# Patient Record
Sex: FEMALE | Race: WHITE | NOT HISPANIC OR LATINO | Employment: PART TIME | URBAN - METROPOLITAN AREA
[De-identification: names, ages, dates, MRNs, and addresses within clinical notes are randomized per-mention and may not be internally consistent; named-entity substitution may affect disease eponyms.]

---

## 2017-02-15 ENCOUNTER — HOSPITAL ENCOUNTER (OUTPATIENT)
Dept: SLEEP MEDICINE | Facility: HOSPITAL | Age: 32
Discharge: HOME | End: 2017-02-15
Admitting: INTERNAL MEDICINE

## 2017-02-15 PROCEDURE — G0463 HOSPITAL OUTPT CLINIC VISIT: HCPCS

## 2017-02-15 PROCEDURE — 99214 OFFICE O/P EST MOD 30 MIN: CPT | Performed by: INTERNAL MEDICINE

## 2017-02-18 PROBLEM — IMO0002 SLEEP-RELATED HYPOVENTILATION: Status: ACTIVE | Noted: 2017-02-18

## 2017-02-18 NOTE — PROGRESS NOTES
[facility] Up [facility] Note 2/15/2017      Patient Care Team:  David Sheldon MD as PCP - General  David Sheldon MD as Emergency Attending (Internal Medicine)    Chief Complaint:  IKRA     Interval History:   The patient was last seen by me in December 2016.  She uses auto titrating BiPAP.  She had a significant oxygen deficit.  I ordered overnight oximetry on BiPAP on room air.  The report from January 18, 2017 does not list if it on room air alone or CPAP on room air?  She still has significant oxygen desaturation to 48% and 1 hour and 25 minutes revealed sleep-related hypoxemia.    The patient reports she is unchanged.  I have no downloads available today.  She goes to bed between 10 and 10:30 PM and awakens between 4:30 and 5 AM.  She wakes up 3-4 times during the nighttime.  Her [place] Sleepiness Scale is abnormal at 16.    Past medical history is unchanged.  However, she is having an MRI done on her lower back.    Family history: Unchanged.    Review of Systems:  Recorded on the Sleep Questionnaire.  Unremarkable except for shortness of breath/ADAMS and atypical chest pain.    Social History:  She continues to smoke cigarettes.  Alcohol is social.  She has 8 caffeinated beverages a day.    Allergies:  No known medical allergies.     Medication Review:  Reviewed.      Vital Signs:  Height 60 inches and weighs 358 and she is obese with a body mass index greater than 54.    Physical Exam:    Constitutional:  Well developed white female and appears in no apparent distress.  Awake & oriented times 3.  Normal mood with normal recent and remote memory and normal judgement.  Eyes:  Conjunctivae normal.  Oropharynx:  moist mucous membranes without exudate and a large tongue and class III Mallampati airway and posterior pharyngeal region not well seen.      Results Review:  DME is [facility] and she uses a fullface mask.  No downloads available.  Overnight oximetry as reported above.       Impression:   Known severe  obstructive sleep apnea with sleep-related hypoxemia with persistent complaints of hypersomnolence.  I have no downloads available to determine if she is compliant and are not.  I have an overnight oximetry that is not recorded on how was done.  The patient does not remember.      Plan:  Good sleep hygiene measures should be maintained weight loss would be beneficial.  Downloads will be obtained at the same time that the patient undergoes a repeat overnight oximetry on BiPAP on room air.  The report should be identified under what circumstances it was done.  If the patient has persistent complaints of sleep-related hypoxemia, an overnight polysomnogram with BiPAP titration and oxygen titration will need to be performed.  Further recommendations will be made after the results are known.      Beau Niño MD  02/18/17  10:32 AM      2/22/2017:  Addendum:  [facility] has provided an updated overnight oximetry report from 1/18/2017.  They stated it is on BiPAP and room air.  The patient demonstrates significant desaturation of 48%.  Total valid sampling time is 6 hours and 24 minutes.  Time with O2 saturations less than 88 is 1 hour and 25 minutes.  Therefore, I would recommend a repeat overnight polysomnogram with positive airway pressure titration as well as oxygen titration.  This will be arranged.  Beau Niño M.D.

## 2017-02-22 ENCOUNTER — TELEPHONE (OUTPATIENT)
Dept: SLEEP MEDICINE | Facility: HOSPITAL | Age: 32
End: 2017-02-22

## 2017-02-22 DIAGNOSIS — IMO0002 SLEEP-RELATED HYPOVENTILATION: ICD-10-CM

## 2017-02-22 DIAGNOSIS — G47.33 OSA TREATED WITH BIPAP: Primary | ICD-10-CM

## 2017-02-22 NOTE — TELEPHONE ENCOUNTER
Spoke with pt about her overnight oximetry while on cpap.  Pt s/u with titration study on 2/28/17 @ 830pm

## 2017-02-28 ENCOUNTER — HOSPITAL ENCOUNTER (OUTPATIENT)
Dept: SLEEP MEDICINE | Facility: HOSPITAL | Age: 32
Discharge: HOME | End: 2017-02-28
Admitting: INTERNAL MEDICINE

## 2017-02-28 DIAGNOSIS — IMO0002 SLEEP-RELATED HYPOVENTILATION: ICD-10-CM

## 2017-02-28 DIAGNOSIS — G47.33 OSA TREATED WITH BIPAP: ICD-10-CM

## 2017-02-28 PROCEDURE — 95811 POLYSOM 6/>YRS CPAP 4/> PARM: CPT | Performed by: INTERNAL MEDICINE

## 2017-02-28 PROCEDURE — 95811 POLYSOM 6/>YRS CPAP 4/> PARM: CPT

## 2017-03-07 ENCOUNTER — TELEPHONE (OUTPATIENT)
Dept: SLEEP MEDICINE | Facility: HOSPITAL | Age: 32
End: 2017-03-07

## 2017-03-07 NOTE — TELEPHONE ENCOUNTER
Spoke with pt about her pap titration.  Info for device faxed to [facility].  Pt already has r/v s/u wit dr. longo for 5/25/17

## 2017-03-14 ENCOUNTER — TELEPHONE (OUTPATIENT)
Dept: SLEEP MEDICINE | Facility: HOSPITAL | Age: 32
End: 2017-03-14

## 2017-03-14 NOTE — TELEPHONE ENCOUNTER
Returned pt's call to [facility].  Unable to leave message @ this # due to mailbox not being set up.

## 2017-03-15 ENCOUNTER — TELEPHONE (OUTPATIENT)
Dept: SLEEP MEDICINE | Facility: HOSPITAL | Age: 32
End: 2017-03-15

## 2017-05-07 ENCOUNTER — HOSPITAL ENCOUNTER (EMERGENCY)
Dept: HOSPITAL 23 - CED | Age: 32
Discharge: HOME | End: 2017-05-07
Payer: COMMERCIAL
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
## 2017-05-07 DIAGNOSIS — S06.0X0A: Primary | ICD-10-CM

## 2017-05-07 DIAGNOSIS — F17.210: ICD-10-CM

## 2017-05-25 ENCOUNTER — APPOINTMENT (OUTPATIENT)
Dept: SLEEP MEDICINE | Facility: HOSPITAL | Age: 32
End: 2017-05-25

## 2017-06-21 ENCOUNTER — HOSPITAL ENCOUNTER (OUTPATIENT)
Dept: SLEEP MEDICINE | Facility: HOSPITAL | Age: 32
Discharge: HOME | End: 2017-06-21
Admitting: INTERNAL MEDICINE

## 2017-06-21 PROCEDURE — 99214 OFFICE O/P EST MOD 30 MIN: CPT | Performed by: INTERNAL MEDICINE

## 2017-06-21 PROCEDURE — G0463 HOSPITAL OUTPT CLINIC VISIT: HCPCS

## 2017-06-26 NOTE — PROGRESS NOTES
Sleep Disorders Center Follow up Note        Patient Care Team:  David Sheldon MD as Emergency Attending (Internal Medicine)  Beau Niño MD as Consulting Physician (Sleep Medicine)    Chief complaint:  KIRA    History of present illness:  The patient is a 32 y.o. female who has known severe obstructive sleep apnea with sleep-related hypoxemia.  She was asleep in the waiting room.  She has marked ADAMS walking back to the office.  The patient states she thinks her machine needs to be replaced because she is having problems with it.  She states she is only sleeping 3-4 hours.  She awakens 4-5 times to go to the bathroom.  [place] Sleepiness Scale is abnormal at 21.    She has been more depressed.  She has not seen her father for 7 years.  Father's day was a hard time for her.  She falls asleep in the living room.    Review of Systems:  Recorded on the Sleep Questionnaire.  Unremarkable except for CHRISTIN, and anxiety and depression.    Past Medical History:  Unchanged since my last visit.    Family history: Unchanged.    Social History:  She continues to smoke cigarettes.  No alcohol.  She has 2 or 3 cups of soda or tea.    Allergies:  No known medical allergies.     Medication Review: Reviewed.    Vital Signs:  Height 60 inches and weight 350 and she is morbidly obese with a body mass index greater than 54.     Physical Exam:  Recorded on the [facility] Physical Exam Form and is unremarkable except for:  Large tongue with class III MP airway and posterior pharyngeal region not well seen.     Results Review:  DME is [facility] and she uses a fullface mask.  Downloads between December 13, 2016-Greer 10, 2017 compliance is 47% and average usage is 4 hours and 7 minutes and average AHI is normal without significant leak.  Average auto BiPAP pressures: IPAP 20 and EPAP 15.8.  Auto BiPAP settings: Max IPAP 25 and minimum EPAP 19 and max pressure support 6 and minimal pressure support 2.       Impression:    Obstructive sleep apnea with sleep-related hypoxemia adequately treated with auto titrating BiPAP.  However, the patient demonstrates poor compliance and usage.  She describes that her machine is not working and she feels that it needs to be replaced?  She also uses O2 2 L with auto BiPAP.  She has persistent complaints of hypersomnolence.    Plan:  Good sleep hygiene measures should be maintained and weight loss would be beneficial.  I reviewed all with the patient.  An order was sent to her DME to check out her BiPAP.  Contact numbers provided.  Additionally, I completed the patient's CMN for her oxygen.    I will see the patient back in 2 months.    Thank you for allowing me to assist in this patient's care.    Beau Niño MD  06/26/17  7:06 AM

## 2017-07-28 ENCOUNTER — OFFICE VISIT (OUTPATIENT)
Dept: FAMILY MEDICINE CLINIC | Facility: CLINIC | Age: 32
End: 2017-07-28

## 2017-07-28 VITALS
HEART RATE: 91 BPM | WEIGHT: 293 LBS | HEIGHT: 60 IN | DIASTOLIC BLOOD PRESSURE: 80 MMHG | OXYGEN SATURATION: 96 % | BODY MASS INDEX: 57.52 KG/M2 | TEMPERATURE: 98.4 F | SYSTOLIC BLOOD PRESSURE: 126 MMHG

## 2017-07-28 DIAGNOSIS — M54.50 PAIN OF LUMBOSACRAL SPINE: ICD-10-CM

## 2017-07-28 DIAGNOSIS — R31.9 HEMATURIA: ICD-10-CM

## 2017-07-28 DIAGNOSIS — M25.562 CHRONIC PAIN OF LEFT KNEE: ICD-10-CM

## 2017-07-28 DIAGNOSIS — F30.9 BIPOLAR I DISORDER, SINGLE MANIC EPISODE (HCC): ICD-10-CM

## 2017-07-28 DIAGNOSIS — R63.1 POLYDIPSIA: ICD-10-CM

## 2017-07-28 DIAGNOSIS — E66.01 MORBID OBESITY DUE TO EXCESS CALORIES (HCC): Primary | ICD-10-CM

## 2017-07-28 DIAGNOSIS — F17.218 CIGARETTE NICOTINE DEPENDENCE WITH OTHER NICOTINE-INDUCED DISORDER: ICD-10-CM

## 2017-07-28 DIAGNOSIS — G89.29 CHRONIC PAIN OF LEFT KNEE: ICD-10-CM

## 2017-07-28 DIAGNOSIS — E28.2 PCOS (POLYCYSTIC OVARIAN SYNDROME): ICD-10-CM

## 2017-07-28 DIAGNOSIS — R73.03 PREDIABETES: ICD-10-CM

## 2017-07-28 PROBLEM — B96.89 AV (ANAEROBIC VAGINOSIS): Status: ACTIVE | Noted: 2017-07-28

## 2017-07-28 PROBLEM — N91.1 AMENORRHEA, SECONDARY: Status: ACTIVE | Noted: 2017-07-28

## 2017-07-28 PROBLEM — J45.30 MILD PERSISTENT ASTHMA WITHOUT COMPLICATION: Status: ACTIVE | Noted: 2017-07-28

## 2017-07-28 PROBLEM — R63.5 WEIGHT GAIN: Status: ACTIVE | Noted: 2017-07-28

## 2017-07-28 PROBLEM — L73.9 FOLLICULITIS: Status: RESOLVED | Noted: 2017-07-28 | Resolved: 2017-07-28

## 2017-07-28 PROBLEM — N76.0 AV (ANAEROBIC VAGINOSIS): Status: ACTIVE | Noted: 2017-07-28

## 2017-07-28 PROBLEM — N76.0 BACTERIAL VAGINITIS: Status: RESOLVED | Noted: 2017-07-28 | Resolved: 2017-07-28

## 2017-07-28 PROBLEM — B37.9 CANDIDA GLABRATA INFECTION: Status: ACTIVE | Noted: 2017-07-28

## 2017-07-28 PROBLEM — B96.89 BACTERIAL VAGINITIS: Status: RESOLVED | Noted: 2017-07-28 | Resolved: 2017-07-28

## 2017-07-28 PROBLEM — L73.9 FOLLICULITIS: Status: ACTIVE | Noted: 2017-07-28

## 2017-07-28 PROBLEM — B37.9 CANDIDIASIS: Status: RESOLVED | Noted: 2017-07-28 | Resolved: 2017-07-28

## 2017-07-28 LAB
BILIRUB BLD-MCNC: NEGATIVE MG/DL
CLARITY, POC: CLEAR
COLOR UR: YELLOW
GLUCOSE UR STRIP-MCNC: NEGATIVE MG/DL
KETONES UR QL: NEGATIVE
LEUKOCYTE EST, POC: NEGATIVE
NITRITE UR-MCNC: NEGATIVE MG/ML
PH UR: 5 [PH] (ref 5–8)
PROT UR STRIP-MCNC: NEGATIVE MG/DL
RBC # UR STRIP: ABNORMAL /UL
SP GR UR: 1.02 (ref 1–1.03)
UROBILINOGEN UR QL: NORMAL

## 2017-07-28 PROCEDURE — 99204 OFFICE O/P NEW MOD 45 MIN: CPT | Performed by: PHYSICIAN ASSISTANT

## 2017-07-28 PROCEDURE — 81003 URINALYSIS AUTO W/O SCOPE: CPT | Performed by: PHYSICIAN ASSISTANT

## 2017-07-28 RX ORDER — NICOTINE 21 MG/24HR
1 PATCH, TRANSDERMAL 24 HOURS TRANSDERMAL EVERY 24 HOURS
Qty: 21 PATCH | Refills: 0 | Status: SHIPPED | OUTPATIENT
Start: 2017-07-28 | End: 2017-09-20

## 2017-07-28 RX ORDER — LURASIDONE HYDROCHLORIDE 20 MG/1
TABLET, FILM COATED ORAL
COMMUNITY

## 2017-07-28 RX ORDER — LEVONORGESTREL AND ETHINYL ESTRADIOL 0.1-0.02MG
1 KIT ORAL
COMMUNITY
Start: 2017-03-02 | End: 2017-08-23 | Stop reason: SDUPTHER

## 2017-07-28 RX ORDER — ALBUTEROL SULFATE 90 UG/1
AEROSOL, METERED RESPIRATORY (INHALATION)
COMMUNITY
Start: 2017-04-02 | End: 2017-12-13 | Stop reason: SDUPTHER

## 2017-07-28 RX ORDER — GABAPENTIN 300 MG/1
CAPSULE ORAL
Refills: 0 | COMMUNITY
Start: 2017-07-20

## 2017-07-28 NOTE — PROGRESS NOTES
Subjective   Catherine Grady is a 32 y.o. female seen today to establish care, complaining of tenderness inside of labia with intercourse, requesting Nicotine Patches to stop smoking     History of Present Illness     PREVIOUS PCP [facility]- LAST APPT MONTHS AGO. STATES WENT TO OFFICE A WEEK OR SO AGO- STATES SHE WENT FOR THE SAME ISSUE BUT LEFT WITHOUT BEING SEEN BECAUSE SHE THOUGHT SHE HAD A UTI AND THE PROVIDER HAD A COUPLE PEOPLE AHEAD OF HER. WAS TO GO BACK TO SEE A NUTRITIONIST ABOUT WEIGHT LOSS AND CONSIDERATION OF BARIATRIC SURGERY.    SEEN BY BARIATRIC SURGERY- NEVER FILLED OUT PACKET OR RETURNED. WAS ADVISED TO LOSE 50 LBS PRIOR TO SURGERY.     SPINE SPECIALIST- SEEN REGULARLY AND LAST APPT 7/10/17. ADVISED BACK PAIN FROM MORBID OBESITY AND IS IN PHYSICAL THERAPY AND REFERRED TO PAIN MANAGEMENT.     OBGYN- DR GASCA- LAST SEEN 3/2017 FOR ANNUAL EXAM. SEEN PRIOR IN 10/2016 FOR PCOS.     PSYCHIATRY- [facility]- SEES ABOUT EVERY 5-6 MONTHS. WAS DOING COUNSELING - SEEING KIRSTIE ALEMAN IN THE [place] IN [place]. HAS NOT BEEN ABLE TO GO RECENTLY DUE TO NEEDING TRANSPORTATION.     PULMONOLOGY- DR LARSON- TREATING FOR KIRA AND ASTHMA. BIPAP WITH SUPPLEMENTAL O2.     NO OTHER SPECIALISTS.     STATES DRINKS INCREASED FLUIDS- DRINKING 2-3 GLASSES OF SWEET TEA AND INCREASED WATER.    The following portions of the patient's history were reviewed and updated as appropriate: allergies, current medications, past family history, past medical history, past social history, past surgical history and problem list.    Review of Systems   Genitourinary: Positive for vaginal pain.   All other systems reviewed and are negative.      Objective   Physical Exam   Constitutional: She is oriented to person, place, and time. She appears well-developed and well-nourished.   HENT:   Head: Normocephalic and atraumatic.   Right Ear: External ear normal.   Left Ear: External ear normal.   Eyes: Conjunctivae are normal.   Neck: Neck  supple.   Cardiovascular: Normal rate, regular rhythm, normal heart sounds and intact distal pulses.  Exam reveals no gallop and no friction rub.    No murmur heard.  Pulmonary/Chest: Breath sounds normal. No respiratory distress (PATIENT WITH HEAVY BREATHING WITH ANY CHANGE OF POSITION). She has no wheezes. She has no rhonchi. She has no rales.   Musculoskeletal: She exhibits no edema or deformity.        Left knee: She exhibits decreased range of motion (DUE TO BODY HABITUS). She exhibits normal patellar mobility. Effusion: DIFFICULT EXAM. Tenderness found. Medial joint line and patellar tendon tenderness noted.   Neurological: She is alert and oriented to person, place, and time.   Skin: Skin is warm and dry.   Psychiatric: She has a normal mood and affect. Her speech is normal and behavior is normal. Judgment and thought content normal. Cognition and memory are normal.   Nursing note and vitals reviewed.      Assessment/Plan   Catherine was seen today for to establish care, tenderness in vaginal area and requesting nicotine patches to stop smoking.    Diagnoses and all orders for this visit:    Morbid obesity due to excess calories  -     CBC & Differential  -     Comprehensive Metabolic Panel  -     Lipid Panel With LDL / HDL Ratio  -     Thyroid Panel With TSH  -     POC Urinalysis Dipstick, Automated  -     Hemoglobin A1c  -     Ambulatory Referral to Physical Therapy Aquatics  -     nicotine (NICODERM CQ) 21 MG/24HR patch; Place 1 patch on the skin Daily.    PCOS (polycystic ovarian syndrome)  -     CBC & Differential  -     Comprehensive Metabolic Panel  -     Lipid Panel With LDL / HDL Ratio  -     Thyroid Panel With TSH  -     POC Urinalysis Dipstick, Automated  -     Hemoglobin A1c    Bipolar I disorder, single manic episode  -     CBC & Differential  -     Comprehensive Metabolic Panel  -     Lipid Panel With LDL / HDL Ratio  -     Thyroid Panel With TSH  -     POC Urinalysis Dipstick, Automated  -      Hemoglobin A1c  -     Ambulatory Referral to Physical Therapy [facility]    Prediabetes  -     CBC & Differential  -     Comprehensive Metabolic Panel  -     Lipid Panel With LDL / HDL Ratio  -     Thyroid Panel With TSH  -     POC Urinalysis Dipstick, Automated  -     Hemoglobin A1c    Polydipsia  -     CBC & Differential  -     Comprehensive Metabolic Panel  -     Lipid Panel With LDL / HDL Ratio  -     Thyroid Panel With TSH  -     POC Urinalysis Dipstick, Automated  -     Hemoglobin A1c    Chronic pain of left knee  -     CBC & Differential  -     Comprehensive Metabolic Panel  -     Lipid Panel With LDL / HDL Ratio  -     Thyroid Panel With TSH  -     POC Urinalysis Dipstick, Automated  -     Hemoglobin A1c  -     Ambulatory Referral to Physical Therapy Aquatics    Pain of lumbosacral spine  -     CBC & Differential  -     Comprehensive Metabolic Panel  -     Lipid Panel With LDL / HDL Ratio  -     Thyroid Panel With TSH  -     POC Urinalysis Dipstick, Automated  -     Hemoglobin A1c  -     Ambulatory Referral to Physical Therapy [facility]    Cigarette nicotine dependence with other nicotine-induced disorder  -     nicotine (NICODERM CQ) 21 MG/24HR patch; Place 1 patch on the skin Daily.      Patient Instructions   32 YEAR OLD FEMALE WHO PRESENTS TODAY AS A NEW PATIENT. PMH SIGNIFICANT FOR MORBID OBESITY, PCOS, BIPOLAR DISORDER, KIRA, ASTHMA, PREDIABETES, AND CHRONIC LOW BACK PAIN. SHE HAS SEEN BARIATRIC SURGEON AND IT HAS BEEN RECOMMENDED THAT SHE LOSE 50 LBS PRIOR TO CONSIDERATION OF BARIATRIC PROCEDURE. SHE SEES OBGYN FOR PCOS WITH MOST RECENT APPT 3/2017 FOR ANNUAL EXAM. SHE IS SEEING PSYCHIATRY AND PSYCHOLOGY REGULARLY FOR TREATMENT OF BIPOLAR. PATIENT ALSO HAS REGULAR FOLLOW UP WITH PULMONOLOGY FOR KIRA AND ASTHMA. PRE PULMONOLOGY, BREATHING AND KIRA DUE TO MORBID OBESITY. SHE HAS CHRONIC LOW BACK PAIN WITH MRI THAT IS NOT CRITICALLY ABNORMAL. PER SPINE SPECIALIST, HER LOW BACK PAIN IS DUE TO MORBID OBESITY.  SHE REPORTS DIFFICULTY WITH EXERCISE DUE TO HER WEIGHT. I WILL REFER FOR PHYSICAL THERAPY TO ALLOW EXERCISE WITHOUT SUCH STRAIN ON JOINTS AND PAIN. PATIENT IS AGREEABLE TO THIS. SHE IS A CURRENT SMOKER BUT WOULD LIKE TO QUIT. SHE FEELS SHE NEEDS HELP WITH CESSATION. WITH UNDERLYING BIPOLAR DISORDER, I WOULD AVOID WELLBUTRIN AND CHANTIX. I WILL SEND IN RX FOR NICOTINE PATCHES.     PATIENT TO HAVE FASTING LABS TODAY- CALL IF NO RESULTS IN 1 WEEK. FOLLOW UP IN ABOUT 1 MONTH UNLESS OTHERWISE NOTED AFTER LABS.     SHE HAS HAD PAIN WITH INTERCOURSE. PATIENT WENT TO PREVIOUS PCP FOR THE SAME AND LEFT WITHOUT BEING SEEN. PATIENT WILL CALL GYN FOR ASAP FOLLOW UP AND EVALUATION. SHE HAS HISTORY OF CANDIDIASIS AND BV. I WILL HAVE GYN EVALUATE SINCE SHE IS ALREADY ESTABLISHED THERE.     KEEP FOLLOW UP WITH ALL SPECIALISTS AS DIRECTED AND FOLLOW UP WITH ME IN 1 MONTH UNLESS OTHERWISE OTHERWISE RECOMMEND PENDING LABS.

## 2017-07-29 LAB
ALBUMIN SERPL-MCNC: 4.1 G/DL (ref 3.5–5.5)
ALBUMIN/GLOB SERPL: 1.1 {RATIO} (ref 1.2–2.2)
ALP SERPL-CCNC: 113 IU/L (ref 39–117)
ALT SERPL-CCNC: 31 IU/L (ref 0–32)
AST SERPL-CCNC: 26 IU/L (ref 0–40)
BASOPHILS # BLD AUTO: 0 X10E3/UL (ref 0–0.2)
BASOPHILS NFR BLD AUTO: 0 %
BILIRUB SERPL-MCNC: 0.2 MG/DL (ref 0–1.2)
BUN SERPL-MCNC: 10 MG/DL (ref 6–20)
BUN/CREAT SERPL: 15 (ref 9–23)
CALCIUM SERPL-MCNC: 9 MG/DL (ref 8.7–10.2)
CHLORIDE SERPL-SCNC: 95 MMOL/L (ref 96–106)
CHOLEST SERPL-MCNC: 158 MG/DL (ref 100–199)
CO2 SERPL-SCNC: 22 MMOL/L (ref 18–29)
CREAT SERPL-MCNC: 0.65 MG/DL (ref 0.57–1)
EOSINOPHIL # BLD AUTO: 0.1 X10E3/UL (ref 0–0.4)
EOSINOPHIL NFR BLD AUTO: 1 %
ERYTHROCYTE [DISTWIDTH] IN BLOOD BY AUTOMATED COUNT: 14.4 % (ref 12.3–15.4)
FT4I SERPL CALC-MCNC: 1.6 (ref 1.2–4.9)
GLOBULIN SER CALC-MCNC: 3.7 G/DL (ref 1.5–4.5)
GLUCOSE SERPL-MCNC: 85 MG/DL (ref 65–99)
HBA1C MFR BLD: 6.6 % (ref 4.8–5.6)
HCT VFR BLD AUTO: 42.8 % (ref 34–46.6)
HDLC SERPL-MCNC: 46 MG/DL
HGB BLD-MCNC: 13.6 G/DL (ref 11.1–15.9)
IMM GRANULOCYTES # BLD: 0 X10E3/UL (ref 0–0.1)
IMM GRANULOCYTES NFR BLD: 0 %
LDLC SERPL CALC-MCNC: 94 MG/DL (ref 0–99)
LDLC/HDLC SERPL: 2 RATIO UNITS (ref 0–3.2)
LYMPHOCYTES # BLD AUTO: 2.6 X10E3/UL (ref 0.7–3.1)
LYMPHOCYTES NFR BLD AUTO: 28 %
MCH RBC QN AUTO: 29.6 PG (ref 26.6–33)
MCHC RBC AUTO-ENTMCNC: 31.8 G/DL (ref 31.5–35.7)
MCV RBC AUTO: 93 FL (ref 79–97)
MONOCYTES # BLD AUTO: 0.7 X10E3/UL (ref 0.1–0.9)
MONOCYTES NFR BLD AUTO: 7 %
NEUTROPHILS # BLD AUTO: 5.9 X10E3/UL (ref 1.4–7)
NEUTROPHILS NFR BLD AUTO: 64 %
PLATELET # BLD AUTO: 198 X10E3/UL (ref 150–379)
POTASSIUM SERPL-SCNC: 5.1 MMOL/L (ref 3.5–5.2)
PROT SERPL-MCNC: 7.8 G/DL (ref 6–8.5)
RBC # BLD AUTO: 4.59 X10E6/UL (ref 3.77–5.28)
SODIUM SERPL-SCNC: 140 MMOL/L (ref 134–144)
T3RU NFR SERPL: 26 % (ref 24–39)
T4 SERPL-MCNC: 6.3 UG/DL (ref 4.5–12)
TRIGL SERPL-MCNC: 91 MG/DL (ref 0–149)
TSH SERPL DL<=0.005 MIU/L-ACNC: 2.82 UIU/ML (ref 0.45–4.5)
VLDLC SERPL CALC-MCNC: 18 MG/DL (ref 5–40)
WBC # BLD AUTO: 9.3 X10E3/UL (ref 3.4–10.8)

## 2017-07-30 LAB
APPEARANCE UR: (no result)
BACTERIA #/AREA URNS HPF: ABNORMAL /[HPF]
BACTERIA UR CULT: NORMAL
BACTERIA UR CULT: NORMAL
BILIRUB UR QL STRIP: NEGATIVE
COLOR UR: YELLOW
EPI CELLS #/AREA URNS HPF: >10 /HPF
GLUCOSE UR QL: NEGATIVE
HGB UR QL STRIP: NEGATIVE
KETONES UR QL STRIP: NEGATIVE
LEUKOCYTE ESTERASE UR QL STRIP: NEGATIVE
MICRO URNS: (no result)
MICRO URNS: (no result)
MUCOUS THREADS URNS QL MICRO: PRESENT
NITRITE UR QL STRIP: NEGATIVE
PH UR STRIP: 6 [PH] (ref 5–7.5)
PROT UR QL STRIP: NEGATIVE
RBC #/AREA URNS HPF: ABNORMAL /HPF
SP GR UR: 1.02 (ref 1–1.03)
UROBILINOGEN UR STRIP-MCNC: 0.2 MG/DL (ref 0.2–1)
WBC #/AREA URNS HPF: ABNORMAL /HPF

## 2017-08-03 DIAGNOSIS — E11.9 TYPE 2 DIABETES MELLITUS WITHOUT COMPLICATION, WITHOUT LONG-TERM CURRENT USE OF INSULIN (HCC): Primary | ICD-10-CM

## 2017-08-09 ENCOUNTER — APPOINTMENT (OUTPATIENT)
Dept: PHYSICAL THERAPY | Facility: HOSPITAL | Age: 32
End: 2017-08-09

## 2017-08-18 ENCOUNTER — HOSPITAL ENCOUNTER (OUTPATIENT)
Dept: PHYSICAL THERAPY | Facility: HOSPITAL | Age: 32
Setting detail: THERAPIES SERIES
Discharge: HOME | End: 2017-08-18

## 2017-08-18 DIAGNOSIS — M25.561 CHRONIC PAIN OF BOTH KNEES: Primary | ICD-10-CM

## 2017-08-18 DIAGNOSIS — E28.2 POLYCYSTIC OVARY SYNDROME: ICD-10-CM

## 2017-08-18 DIAGNOSIS — E66.01 MORBID OBESITY, UNSPECIFIED OBESITY TYPE (HCC): ICD-10-CM

## 2017-08-18 DIAGNOSIS — M54.50 CHRONIC BILATERAL LOW BACK PAIN WITHOUT SCIATICA: ICD-10-CM

## 2017-08-18 DIAGNOSIS — M25.562 CHRONIC PAIN OF BOTH KNEES: Primary | ICD-10-CM

## 2017-08-18 DIAGNOSIS — G89.29 CHRONIC PAIN OF BOTH KNEES: Primary | ICD-10-CM

## 2017-08-18 DIAGNOSIS — G89.29 CHRONIC BILATERAL LOW BACK PAIN WITHOUT SCIATICA: ICD-10-CM

## 2017-08-18 PROCEDURE — 97162 PT EVAL MOD COMPLEX 30 MIN: CPT | Performed by: PHYSICAL THERAPIST

## 2017-08-18 PROCEDURE — G8978 MOBILITY CURRENT STATUS: HCPCS | Performed by: PHYSICAL THERAPIST

## 2017-08-18 PROCEDURE — G8979 MOBILITY GOAL STATUS: HCPCS | Performed by: PHYSICAL THERAPIST

## 2017-08-18 NOTE — THERAPY EVALUATION
Outpatient Physical Therapy Ortho Initial Evaluation  [facility]     Patient Name: Catherine Grady  : 1985  MRN: [id number]  Today's Date: 2017      Visit Date: 2017    Patient Active Problem List   Diagnosis   • KIRA treated with autoBiPAP   • Sleep-related hypoxia   • Amenorrhea, secondary   • Mild persistent asthma without complication   • Bipolar I disorder, single manic episode   • Morbid obesity due to excess calories   • Pain of lumbosacral spine   • PCOS (polycystic ovarian syndrome)   • Weight gain   • Prediabetes        Past Medical History:   Diagnosis Date   • Anxiety    • Arthritis    • Asthma    • Bipolar 1 disorder    • Polycystic ovarian syndrome    • Sleep apnea         No past surgical history on file.    Visit Dx:     ICD-10-CM ICD-9-CM   1. Chronic pain of both knees M25.561 719.46    M25.562 338.29    G89.29    2. Chronic bilateral low back pain without sciatica M54.5 724.2    G89.29 338.29   3. Morbid obesity, unspecified obesity type E66.01 278.01   4. Polycystic ovary syndrome E28.2 256.4             Patient History       17 1700          History    Chief Complaint Difficulty Walking;Difficulty with daily activities;Falls/history of falls;Fatigue/poor endurance;Joint stiffness;Joint swelling;Muscle tenderness;Muscle weakness;Pain;Problems breathing/shortness of breath  -ZK      Type of Pain Back pain;Knee pain  -ZK      Date Current Problem(s) Began 14  -ZK      Brief Description of Current Complaint 32 year old dx with Bi-polar at an early age affecting her ability to learn and exercise and maintain a good weight as a younger girl. Worked as a  at [employer] at an early age and a few years back dx with polycystic ovarian syndrome that resulted in extreme wt gain where she quickly went from 200 to over 400 pounds. Has been on some disabililty as a child but due to increasing difficulty with breathing, back and knee pain that intensified in , she  eventually stopped working in 2016. Has been indep with amb throughout but just 2 months ago obtained a power w/c as her ability to walk functional distances became even more troublesome, tk to her left knee. Now on inhaler prn and Bi-pap at night as she states she has had severe enough apnea that she has fallen asleep on the toilet resulting in a fall to the floor. Smoked for many years until stopping on August 1st just 3 weeks ago. Also now working with a  on diet and very easy walking lifting program and is currently at 354#. Goal is to lose  more pounds and be considered for gastric bypass if possible. Also wants to avoid knee surgery but MD has discussed ortho referral if left knee does not improve. MRI in chart but not able to obtain report. Prior lumbar x-ray in 2014 was not remarkable for DDD, but in 2016 films showed DDD of L3-L4.    -ZK      Previous treatment for THIS PROBLEM Rehabilitation   had 2 PT sessions at [facility] recently for ex but referred here  -ZK      Patient/Caregiver Goals Relieve pain;Improve strength;Improve mobility;Know what to do to help the symptoms   lose wt, get . obtain GED and maybe work again  -ZK      Patient's Rating of General Health Poor  -ZK      Are you or can you be pregnant No  -ZK      Pain     Pain Location Back;Knee   tk L knee  -ZK      Pain at Present 6  -ZK      Pain at Best 5  -ZK      Pain at Worst 9  -ZK      What Performance Factors Make the Current Problem(s) WORSE? see NAYELI and KOS  -ZK      Fall Risk Assessment    Any falls in the past year: Yes  -ZK      Number of falls reported in the last 12 months 5 or more  -ZK      Factors that contributed to the fall: Fatigue;Lost balance;Tripped;Crowded environment  -ZK      Does patient have a fear of falling Yes (comment)  -ZK      Daily Activities    Primary Language English  -ZK      Barriers to learning Cognitive   admits some OCD and cognitive issues   -ZK      Action taken for identified  issues alllow more time for teach back  -ZK      Pt Participated in POC and Goals Yes  -ZK      Safety    Are you being hurt, hit, or frightened by anyone at home or in your life? No  -ZK      Are you being neglected by a caregiver No  -ZK        User Key  (r) = Recorded By, (t) = Taken By, (c) = Cosigned By    Initials Name Provider Type    ZK Zorre Zeno Kimura, PT Physical Therapist                PT Ortho       08/18/17 1700    Subjective Comments    Subjective Comments no prior water experience but eager to try. States  pushes her but allow her to rest if Left knee hurts.   -ZK    Posture/Observations    Alignment Options Genu valgus   tk L knee with some lateral laxity  -ZK    Posture/Observations Comments severe recurvatum Right knee  -ZK    ROM (Range of Motion)    General ROM head/neck/trunk range of motion deficits identified   60 degrees lumbar flex with no reverse lordosis  -ZK    Additional Documentation --   knee ROM to 110 limited by girth and some end range pain  -ZK    MMT (Manual Muscle Testing)    General MMT Assessment lower extremity strength deficits identified   4- left knee to resistance with patellar pain. Able to SLS  -ZK    General MMT Assessment Detail --   good UE strength and core strong enough to supine to sit I  -ZK    Pathomechanics    Pathomechanics Comments more recurvatum R knee but limited extension with subpatellar pain and crepitus L knee  -ZK    Balance Skills Training    SLS 5 sec R; 3 sec L  -ZK    Transfers    Transfer, Comment 5x sts WNL  -ZK    Gait Assessment/Treatment    Gait, Comment Amb with good gait speed with steady gait in exam area today. Endurance affected by SOA and some left knee pain.   -ZK    Stairs Assessment/Treatment    Stairs, Comment States no issue with steps if railing present. Able to do 2-3 steps at home and stated using steps vs. lift should be no problem.   -ZK      User Key  (r) = Recorded By, (t) = Taken By, (c) = Cosigned By    Initials  Name Provider Type    ZK Zorre Zeno Kimura, PT Physical Therapist                                PT OP Goals       08/18/17 1700       PT Short Term Goals    STG Date to Achieve 09/18/17  -ZK     STG 1 Pt to tolerate 45 minutes of aquatic ex with no ill effects on knee, back pain, or SOA  -ZK     STG 1 Progress New  -ZK     STG 2 Pt to state improved ROM and less sub patellar pain L knee with worse pain < 5/10  -ZK     STG 2 Progress New  -ZK     STG 3 Pt to state ongoing success with  and wt loss each week.   -ZK     STG 3 Progress New  -ZK     STG 4 NAYELI to improve from 54 to < 45%  -ZK     STG 4 Progress New  -ZK     STG 5 KOS to improve from 51 to < 45%  -ZK     STG 5 Progress New  -ZK     Long Term Goals    LTG Date to Achieve 10/18/17  -ZK     LTG 1 Pt to be indep in water walking and other core and LE ex   -ZK     LTG 2 Pt to state worse back and knee pain to be < 4/10  -ZK     LTG 3 Pt avoid need for ortho and injections/surgery for L knee  -ZK     LTG 4 Pt to state less need for pain management meds or injections  -ZK     LTG 5 NAYELI to be < 35% at dc  -ZK     LTG 6 KOS to be < 35% at dc  -ZK     Time Calculation    PT Goal Re-Cert Due Date 09/18/17  -ZK       User Key  (r) = Recorded By, (t) = Taken By, (c) = Cosigned By    Initials Name Provider Type    ZK Zorre Zeno Kimura, PT Physical Therapist                PT Assessment/Plan       08/18/17 1750       PT Assessment    Functional Limitations Impaired locomotion;Limitations in community activities;Limitations in functional capacity and performance;Performance in leisure activities;Performance in self-care ADL  -ZK     Impairments Joint integrity;Joint mobility;Pain;Muscle strength;Locomotion  -ZK     Assessment Comments Pt with obesity due to dx of ovarian syndrome combined with smoking and lifestyle/stress issues. Pt now with a plan to use a  and PT and medical advice to lose # and qualify for gastric bypass or maybe even manage 150#  wt loss without surgery. Need to try and obtain MRI report if possible. Currently stability and pain in left knee and recurvatum in her right knee make aquatic rehab an excellent choice for this patient, but she still may require ortho referral.   -SHONNA     Please refer to paper survey for additional self-reported information Yes  -SABRINAK     Rehab Potential Fair  -SHONNA     Patient/caregiver participated in establishment of treatment plan and goals Yes  -SABRINAK     Patient would benefit from skilled therapy intervention Yes  -SABRINAK     PT Plan    PT Frequency 2x/week  -SHONNA     Predicted Duration of Therapy Intervention (days/wks) 4-8 weeks  -SABRINAK     Planned CPT's? PT EVAL MOD COMPLELITY: 22857;PT AQUATIC THERAPY EA 15 MIN: 30657  -SHONNA       User Key  (r) = Recorded By, (t) = Taken By, (c) = Cosigned By    Initials Name Provider Type    ZK Zorre Zeno Kimura, PT Physical Therapist                  Exercises       08/18/17 1700          Subjective Comments    Subjective Comments no prior water experience but eager to try. States  pushes her but allow her to rest if Left knee hurts.   -SHONNA        User Key  (r) = Recorded By, (t) = Taken By, (c) = Cosigned By    Initials Name Provider Type    ZK Zorre Zeno Kimura, PT Physical Therapist                              Outcome Measures       08/18/17 1700          Knee Outcome Score    Knee Outcome Score Comments 51  -ZK      Modified Oswestry    Modified Oswestry Score/Comments 54  -ZK      Functional Assessment    Outcome Measure Options Modifed Owestry;Knee Outcome Score- ADL   54%  -SHONNA        User Key  (r) = Recorded By, (t) = Taken By, (c) = Cosigned By    Initials Name Provider Type    ZK Zorre Zeno Kimura, PT Physical Therapist            Time Calculation:   Start Time: 1445  Stop Time: 1530  Time Calculation (min): 45 min     Therapy Charges for Today     Code Description Service Date Service Provider Modifiers Qty    68138292479  PT MOBILITY CURRENT 8/18/2017 Anushka Armando  Kimura, PT GP, CK 1    65282799268 HC PT MOBILITY PROJECTED 8/18/2017 Zorre Zeno Kimura, PT GP, CJ 1    66176870627 HC PT AQUA EVAL MOD COMPLEXITY 3 8/18/2017 Zorre Zeno Kimura, PT GP 1          PT G-Codes  Outcome Measure Options: Modifed Owestry, Knee Outcome Score- ADL (54%)  Score: 51% KOS; 54% NAYELI  Functional Limitation: Mobility: Walking and moving around  Mobility: Walking and Moving Around Current Status (): At least 40 percent but less than 60 percent impaired, limited or restricted  Mobility: Walking and Moving Around Goal Status (): At least 20 percent but less than 40 percent impaired, limited or restricted         Zorre Zeno Kimura, PT  8/18/2017

## 2017-08-21 ENCOUNTER — TELEPHONE (OUTPATIENT)
Dept: FAMILY MEDICINE CLINIC | Facility: CLINIC | Age: 32
End: 2017-08-21

## 2017-08-21 NOTE — TELEPHONE ENCOUNTER
Patient walked in asking for a letter stating she needs her power scooter for when she has appts because she can't walk far distances, she needs to give this letter to the transport service that she uses for appointments, she has an appointment with Vandana on Wednesday 8-23-17 and she will  the letter then.  Any questions, please call her at [phone]

## 2017-08-21 NOTE — TELEPHONE ENCOUNTER
I CAN REFER HER TO PHYSICAL THERAPY OR OCCUPATIONAL THERAPY FOR EVALUATION FOR APPROPRIATENESS OF THIS. I WOULD AWAIT EVALUATION AND RECOMMENDATIONS FROM THERAPIST PRIOR TO WRITING LETTER.

## 2017-08-22 NOTE — TELEPHONE ENCOUNTER
Patient states she is already going to Physical Therapy and will discuss with you tomorrow at her appointment

## 2017-08-23 ENCOUNTER — OFFICE VISIT (OUTPATIENT)
Dept: FAMILY MEDICINE CLINIC | Facility: CLINIC | Age: 32
End: 2017-08-23

## 2017-08-23 VITALS
SYSTOLIC BLOOD PRESSURE: 122 MMHG | DIASTOLIC BLOOD PRESSURE: 80 MMHG | HEIGHT: 60 IN | BODY MASS INDEX: 57.52 KG/M2 | TEMPERATURE: 98.8 F | OXYGEN SATURATION: 97 % | HEART RATE: 91 BPM | WEIGHT: 293 LBS

## 2017-08-23 DIAGNOSIS — G89.29 CHRONIC PAIN OF BOTH KNEES: Primary | ICD-10-CM

## 2017-08-23 DIAGNOSIS — M25.561 CHRONIC PAIN OF BOTH KNEES: Primary | ICD-10-CM

## 2017-08-23 DIAGNOSIS — M25.562 CHRONIC PAIN OF BOTH KNEES: Primary | ICD-10-CM

## 2017-08-23 PROCEDURE — 99213 OFFICE O/P EST LOW 20 MIN: CPT | Performed by: PHYSICIAN ASSISTANT

## 2017-08-23 RX ORDER — HYDROCODONE BITARTRATE AND ACETAMINOPHEN 7.5; 325 MG/1; MG/1
1 TABLET ORAL EVERY 6 HOURS PRN
COMMUNITY

## 2017-08-23 RX ORDER — LEVONORGESTREL AND ETHINYL ESTRADIOL 0.1-0.02MG
KIT ORAL
COMMUNITY
Start: 2017-03-02 | End: 2018-03-02

## 2017-08-23 NOTE — PROGRESS NOTES
Subjective   Catherine Grady is a 32 y.o. female seen today for bilateral knee pain for 1 year, needs a note for medical transport that she needs transport in a wheelchair    History of Present Illness     PATIENT REPORTS HAS HAD PAIN IN KNEES FOR OVER A YEAR. STATES WORSENING. CANNOT KNEEL DOWN WITHOUT FEELING LIKE RIGHT LEG IS COMING TROUGH THE SKIN. POPPING KNEE.     SEEING PAIN MANAGEMENT- GETS INJECTIONS IN BACK AND PAIN MEDICATION.     The following portions of the patient's history were reviewed and updated as appropriate: allergies, current medications, past family history, past medical history, past social history, past surgical history and problem list.    Review of Systems   Musculoskeletal:        Bilateral knee pain        Objective   Physical Exam   Constitutional: She is oriented to person, place, and time. She appears well-developed and well-nourished.   HENT:   Head: Normocephalic and atraumatic.   Right Ear: External ear normal.   Left Ear: External ear normal.   Eyes: Conjunctivae are normal.   Neck: Neck supple.   Cardiovascular: Normal rate, regular rhythm, normal heart sounds and intact distal pulses.  Exam reveals no gallop and no friction rub.    No murmur heard.  Pulmonary/Chest: Effort normal and breath sounds normal. No respiratory distress. She has no wheezes. She has no rales.   Musculoskeletal: She exhibits no edema or deformity.   Neurological: She is alert and oriented to person, place, and time.   Skin: Skin is warm and dry.   Psychiatric: She has a normal mood and affect. Her speech is normal and behavior is normal. Judgment and thought content normal. Cognition and memory are normal.   Nursing note and vitals reviewed.      Assessment/Plan   Catherine was seen today for bilateral knee pain and need a note for medical transportation states she needs whee.    Diagnoses and all orders for this visit:    Chronic pain of both knees  -     Ambulatory Referral to Orthopedic Surgery      Patient  Instructions   32 YEAR OLD FEMALE WHO PRESENTS TODAY IN FOLLOW UP OF KNEE PAIN. PATIENT IS MORBIDLY OBESE, WHICH HAS BEEN ETIOLOGY OF CHRONIC BACK PAIN AND SOA. SHE HAS CHRONIC KNEE PAIN WITH WORSENING RECENTLY. I WILL REFER TO ORTHOPEDIST FOR EVALUATION. SHE SHOULD CONTINUE WATER PHYSICAL THERAPY. PATIENT IS WANTING A STATEMENT FOR TRANSPORTATION THAT SHE HAS TO HAVE MOTORIZED SCOOTER. SHE CAN CHECK WITH PHYSICAL THERAPIST TO SEE IF THEY WILL PERFORM GAIT EVALUATION AND RECOMMENDATIONS FOR THE NEED FOR AMBULATORY AIDS OR SCOOTER. IF THEY CANNOT PERFORM, I CAN REFER TO OCCUPATIONAL THERAPY.

## 2017-08-30 ENCOUNTER — APPOINTMENT (OUTPATIENT)
Dept: SLEEP MEDICINE | Facility: HOSPITAL | Age: 32
End: 2017-08-30

## 2017-08-30 NOTE — PATIENT INSTRUCTIONS
32 YEAR OLD FEMALE WHO PRESENTS TODAY IN FOLLOW UP OF KNEE PAIN. PATIENT IS MORBIDLY OBESE, WHICH HAS BEEN ETIOLOGY OF CHRONIC BACK PAIN AND SOA. SHE HAS CHRONIC KNEE PAIN WITH WORSENING RECENTLY. I WILL REFER TO ORTHOPEDIST FOR EVALUATION. SHE SHOULD CONTINUE WATER PHYSICAL THERAPY. PATIENT IS WANTING A STATEMENT FOR TRANSPORTATION THAT SHE HAS TO HAVE MOTORIZED SCOOTER. SHE CAN CHECK WITH PHYSICAL THERAPIST TO SEE IF THEY WILL PERFORM GAIT EVALUATION AND RECOMMENDATIONS FOR THE NEED FOR AMBULATORY AIDS OR SCOOTER. IF THEY CANNOT PERFORM, I CAN REFER TO OCCUPATIONAL THERAPY.

## 2017-09-12 ENCOUNTER — HOSPITAL ENCOUNTER (OUTPATIENT)
Dept: PHYSICAL THERAPY | Facility: HOSPITAL | Age: 32
Setting detail: THERAPIES SERIES
Discharge: HOME | End: 2017-09-12

## 2017-09-12 DIAGNOSIS — E28.2 POLYCYSTIC OVARY SYNDROME: ICD-10-CM

## 2017-09-12 DIAGNOSIS — G89.29 CHRONIC BILATERAL LOW BACK PAIN WITHOUT SCIATICA: ICD-10-CM

## 2017-09-12 DIAGNOSIS — E66.01 MORBID OBESITY, UNSPECIFIED OBESITY TYPE (HCC): ICD-10-CM

## 2017-09-12 DIAGNOSIS — M25.561 CHRONIC PAIN OF BOTH KNEES: Primary | ICD-10-CM

## 2017-09-12 DIAGNOSIS — M25.562 CHRONIC PAIN OF BOTH KNEES: Primary | ICD-10-CM

## 2017-09-12 DIAGNOSIS — M54.50 CHRONIC BILATERAL LOW BACK PAIN WITHOUT SCIATICA: ICD-10-CM

## 2017-09-12 DIAGNOSIS — G89.29 CHRONIC PAIN OF BOTH KNEES: Primary | ICD-10-CM

## 2017-09-12 PROCEDURE — 97113 AQUATIC THERAPY/EXERCISES: CPT

## 2017-09-12 NOTE — THERAPY PROGRESS REPORT/RE-CERT
Outpatient Physical Therapy Ortho Progress Note (30 day)   [facility]     Patient Name: Catherine Grady  : 1985  MRN: [id number]  Today's Date: 2017      Visit Date: 2017    Visit Dx:    ICD-10-CM ICD-9-CM   1. Chronic pain of both knees M25.561 719.46    M25.562 338.29    G89.29    2. Chronic bilateral low back pain without sciatica M54.5 724.2    G89.29 338.29   3. Morbid obesity, unspecified obesity type E66.01 278.01   4. Polycystic ovary syndrome E28.2 256.4       Patient Active Problem List   Diagnosis   • KIRA treated with autoBiPAP   • Sleep-related hypoxia   • Amenorrhea, secondary   • Mild persistent asthma without complication   • Bipolar I disorder, single manic episode   • Morbid obesity due to excess calories   • Pain of lumbosacral spine   • PCOS (polycystic ovarian syndrome)   • Weight gain   • Prediabetes        Past Medical History:   Diagnosis Date   • Anxiety    • Arthritis    • Asthma    • Bipolar 1 disorder    • Polycystic ovarian syndrome    • Sleep apnea         No past surgical history on file.                          PT Assessment/Plan       17 1700       PT Assessment    Functional Limitations Impaired locomotion;Limitations in community activities;Limitations in functional capacity and performance;Performance in leisure activities;Performance in self-care ADL  -SP     Impairments Joint integrity;Joint mobility;Pain;Muscle strength;Locomotion  -SP     Assessment Comments Pt just now started aqua session due to schedule. so same findings as eval. In review: Pt with obesity due to dx of ovarian syndrome combined with smoking and lifestyle/stress issues. Pt now with a plan to use a  and PT and medical advice to lose # and qualify for gastric bypass or maybe even manage 150# wt loss without surgery. Need to try and obtain MRI report if possible. Currently stability and pain in left knee and recurvatum in her right knee make aquatic rehab an excellent  choice for this patient, but she still may require ortho referral. On first session, pt was positive regarding benefit, able to use stairs and had some immediate improved flexibility.   -SP     Please refer to paper survey for additional self-reported information Yes  -SP     Rehab Potential Fair  -SP     Patient/caregiver participated in establishment of treatment plan and goals Yes  -SP     Patient would benefit from skilled therapy intervention Yes  -SP     PT Plan    PT Frequency 2x/week  -SP     Predicted Duration of Therapy Intervention (days/wks) 4-8 weeks   -SP     Planned CPT's? PT AQUATIC THERAPY EA 15 MIN: 96692  -SP     Physical Therapy Interventions (Optional Details) aquatics exercise  -SP     PT Plan Comments f/u on response to first visit. Ensure pt has ongoing appts for care plan needs.   -SP       User Key  (r) = Recorded By, (t) = Taken By, (c) = Cosigned By    Initials Name Provider Type    SP Yessy Rust, PT Physical Therapist                    Exercises       09/12/17 1700          Subjective Comments    Subjective Comments I want to get walking better. working on [place] and has some appts and may need to adjust schedule.   -SP      Subjective Pain    Able to rate subjective pain? yes  -SP      Pre-Treatment Pain Level 7  -SP      Post-Treatment Pain Level 7  -SP      Subjective Pain Comment Pt felt looser, tk in ankles after ex. verbalized much easier to move in water. knee on left popped 3 times in water. Said it does that alot.   -SP      Aquatics    Aquatics performed? Yes  -SP      Aquatics LE    Water Walk forward   4 HHA   -SP      Stretch 1 calf stretch 20 sec X 1 B   -SP      Stretch 2 laq at bench slow b x 10 : 2 sets   -SP      Stretch 3 trunk flexion with LN hold x 10 at bench (felt good)   -SP      Hip Abd/Add 10 x b gentle stretch benefit   -SP      Hip Flex/Ext 10x b - stretched at end rom   -SP      March in Place 10 x   -SP      Exercise 1    Exercise Name 1 enter exit: At  rail with cga left rail decending.   -SP        User Key  (r) = Recorded By, (t) = Taken By, (c) = Cosigned By    Initials Name Provider Type    POPPY Rust, PT Physical Therapist                               PT OP Goals       09/12/17 1700       PT Short Term Goals    STG Date to Achieve 09/18/17  -SP     STG 1 Pt to tolerate 45 minutes of aquatic ex with no ill effects on knee, back pain, or SOA  -SP     STG 1 Progress Ongoing;Progressing  -SP     STG 1 Progress Comments stared aqua this date, did well, will await response   -SP     STG 2 Pt to state improved ROM and less sub patellar pain L knee with worse pain < 5/10  -SP     STG 2 Progress Ongoing  -SP     STG 3 Pt to state ongoing success with  and wt loss each week.   -SP     STG 3 Progress New  -SP     STG 3 Progress Comments no new findings   -SP     STG 4 NAYELI to improve from 54 to < 45%  -SP     STG 4 Progress New  -SP     STG 5 KOS to improve from 51 to < 45%  -SP     STG 5 Progress New  -SP     Long Term Goals    LTG Date to Achieve 10/18/17  -SP     LTG 1 Pt to be indep in water walking and other core and LE ex   -SP     LTG 1 Progress Progressing  -SP     LTG 1 Progress Comments HHA in pool this date   -SP     LTG 2 Pt to state worse back and knee pain to be < 4/10  -SP     LTG 2 Progress Ongoing  -SP     LTG 3 Pt avoid need for ortho and injections/surgery for L knee  -SP     LTG 3 Progress Ongoing  -SP     LTG 3 Progress Comments to see pain MD soon/get ortho consult   -SP     LTG 4 Pt to state less need for pain management meds or injections  -SP     LTG 4 Progress Ongoing  -SP     LTG 4 Progress Comments no change.   -SP     LTG 5 NAYELI to be < 35% at dc  -SP     LTG 5 Progress Ongoing  -SP     LTG 5 Progress Comments 1st session since eval   -SP     LTG 6 KOS to be < 35% at dc  -SP     LTG 6 Progress Ongoing  -SP     LTG 6 Progress Comments 1st session since eval   -SP     Time Calculation    PT Goal Re-Cert Due Date 10/12/17  -SP        User Key  (r) = Recorded By, (t) = Taken By, (c) = Cosigned By    Initials Name Provider Type    SP Yessy Rust PT Physical Therapist                Therapy Education       09/12/17 1700          Therapy Education    Education Details water benefits/qualities   -SP      Program New  -SP      How Provided Verbal  -SP      Provided to Patient  -SP      Level of Understanding Verbalized  -SP        User Key  (r) = Recorded By, (t) = Taken By, (c) = Cosigned By    Initials Name Provider Type    POPPY Rust PT Physical Therapist                Time Calculation:   Start Time: 1515  Stop Time: 1600  Time Calculation (min): 45 min    Therapy Charges for Today     Code Description Service Date Service Provider Modifiers Qty    73914348969 HC PT AQUATIC THERAPY EA 15 MIN 9/12/2017 Yessy Rust, PT GP 3                    Yessy Rust PT  9/12/2017

## 2017-09-19 ENCOUNTER — HOSPITAL ENCOUNTER (OUTPATIENT)
Dept: PHYSICAL THERAPY | Facility: HOSPITAL | Age: 32
Setting detail: THERAPIES SERIES
Discharge: HOME | End: 2017-09-19

## 2017-09-19 DIAGNOSIS — M54.50 CHRONIC BILATERAL LOW BACK PAIN WITHOUT SCIATICA: ICD-10-CM

## 2017-09-19 DIAGNOSIS — M25.561 CHRONIC PAIN OF BOTH KNEES: Primary | ICD-10-CM

## 2017-09-19 DIAGNOSIS — G89.29 CHRONIC BILATERAL LOW BACK PAIN WITHOUT SCIATICA: ICD-10-CM

## 2017-09-19 DIAGNOSIS — M25.562 CHRONIC PAIN OF BOTH KNEES: Primary | ICD-10-CM

## 2017-09-19 DIAGNOSIS — E28.2 POLYCYSTIC OVARY SYNDROME: ICD-10-CM

## 2017-09-19 DIAGNOSIS — G89.29 CHRONIC PAIN OF BOTH KNEES: Primary | ICD-10-CM

## 2017-09-19 DIAGNOSIS — E66.01 MORBID OBESITY, UNSPECIFIED OBESITY TYPE (HCC): ICD-10-CM

## 2017-09-19 PROCEDURE — 97113 AQUATIC THERAPY/EXERCISES: CPT

## 2017-09-19 NOTE — THERAPY TREATMENT NOTE
Outpatient Physical Therapy Ortho Treatment Note  [facility]     Patient Name: Catherine Grady  : 1985  MRN: [id number]  Today's Date: 2017      Visit Date: 2017    Visit Dx:    ICD-10-CM ICD-9-CM   1. Chronic pain of both knees M25.561 719.46    M25.562 338.29    G89.29    2. Chronic bilateral low back pain without sciatica M54.5 724.2    G89.29 338.29   3. Morbid obesity, unspecified obesity type E66.01 278.01   4. Polycystic ovary syndrome E28.2 256.4       Patient Active Problem List   Diagnosis   • KIRA treated with autoBiPAP   • Sleep-related hypoxia   • Amenorrhea, secondary   • Mild persistent asthma without complication   • Bipolar I disorder, single manic episode   • Morbid obesity due to excess calories   • Pain of lumbosacral spine   • PCOS (polycystic ovarian syndrome)   • Weight gain   • Prediabetes        Past Medical History:   Diagnosis Date   • Anxiety    • Arthritis    • Asthma    • Bipolar 1 disorder    • Polycystic ovarian syndrome    • Sleep apnea         No past surgical history on file.                          PT Assessment/Plan       17 1100       PT Assessment    Assessment Comments Ot was running late, but had accomodated this PT with early session, was able to work with pt despite late due to (pt cancellation following). Pt was having an exacerbation day, pt self starts in water appropriately feeling benefits of stretches and was able to walk more this date in water and more independently. Pt sees Ortho tomorrow about her knees.   -SP     PT Plan    PT Plan Comments continue to progress ex, add arms/core   -SP       User Key  (r) = Recorded By, (t) = Taken By, (c) = Cosigned By    Initials Name Provider Type    SP Yessy Rust, PT Physical Therapist                    Exercises       17 0800          Subjective Comments    Subjective Comments My medical ride was late. Sorry (pt late)   -SP      Subjective Pain    Able to rate subjective pain? yes  -SP       Pre-Treatment Pain Level 10  -SP      Post-Treatment Pain Level 7  -SP      Subjective Pain Comment R knee is hurting a lot, left side, too, but let gets locked up. Better after it pops, also better in the water.   -SP      Aquatics    Aquatics performed? Yes  -SP      Aquatics LE    Water Walk forward   4 plus 4 plus 2 in pool with noodle, sba   -SP      Stretch 1 calf stretch 5-10sec X 10 B using wall push up   -SP      Stretch 2 laq at bench slow b x 20 : 2 sets   -SP      Stretch 3 trunk flexion with LN hold x 20 at bench (felt good)   -SP      Stretch Other 1 floats forward with noodle, legs in neutral extension (pt almost prone) facing rail, 20 sec x 3   -SP      Vertical Traction Large noodle in front facing rail, reports back popped, felt good   -SP      Hip Abd/Add 10 x b gentle stretch benefit   -SP      Hip Flex/Ext 10x b - stretched at end rom   -SP      March in Place 20 x 2   -SP      Toe/Heel Raises 10/10 DF pulls   -SP      Bicycle at bench x 30 sec   -SP      Exercise 1    Exercise Name 1 enter exit: At rail with cga left rail decending. (faces rail/ verbal cues)   -SP        User Key  (r) = Recorded By, (t) = Taken By, (c) = Cosigned By    Initials Name Provider Type    SP Yessy Rust, PT Physical Therapist                                       Time Calculation:   Start Time: 0850  Stop Time: 0945  Time Calculation (min): 55 min    Therapy Charges for Today     Code Description Service Date Service Provider Modifiers Qty    19127429543 HC PT AQUATIC THERAPY EA 15 MIN 9/19/2017 Yessy Rust, PT GP 4                    Yessy Rust, PT  9/19/2017

## 2017-09-20 ENCOUNTER — OFFICE VISIT (OUTPATIENT)
Dept: ORTHOPEDIC SURGERY | Facility: CLINIC | Age: 32
End: 2017-09-20

## 2017-09-20 VITALS — BODY MASS INDEX: 57.52 KG/M2 | WEIGHT: 293 LBS | HEIGHT: 60 IN | TEMPERATURE: 97.8 F

## 2017-09-20 DIAGNOSIS — M25.562 CHRONIC PAIN OF BOTH KNEES: Primary | ICD-10-CM

## 2017-09-20 DIAGNOSIS — G89.29 CHRONIC PAIN OF BOTH KNEES: Primary | ICD-10-CM

## 2017-09-20 DIAGNOSIS — M17.0 ARTHRITIS OF BOTH KNEES: ICD-10-CM

## 2017-09-20 DIAGNOSIS — S83.242A TEAR OF MEDIAL MENISCUS OF LEFT KNEE, CURRENT, UNSPECIFIED TEAR TYPE, INITIAL ENCOUNTER: ICD-10-CM

## 2017-09-20 DIAGNOSIS — M25.561 CHRONIC PAIN OF BOTH KNEES: Primary | ICD-10-CM

## 2017-09-20 PROCEDURE — 73562 X-RAY EXAM OF KNEE 3: CPT | Performed by: ORTHOPAEDIC SURGERY

## 2017-09-20 PROCEDURE — 99204 OFFICE O/P NEW MOD 45 MIN: CPT | Performed by: ORTHOPAEDIC SURGERY

## 2017-09-20 NOTE — PROGRESS NOTES
New Patient Complaint      Patient: Catherine Grady  YOB: 1985 32 y.o. female  Medical Record Number: [id number]    Chief Complaints: Knees hurt    History of Present Illness:  Patient is seen today with a one-year history of severe constant aching pain with painful feelings of catching and popping in the left more so than the right knee with associated swelling is worse with standing sitting and walking and improved some with anti-inflammatories.  No injury of which she is aware she feels like it is due to being overweight and her obesity which she feels due to her polycystic ovary disease.    She has been doing some water therapy at [facility] and does do a limited amount of walking each day trying to improve her strength and stamina she's not been using a walker        She is seen today at the request of Vandana Becerra who is requested my opinion regarding etiology and treatment of this condition    HPI    Allergies: No Known Allergies    Medications:   Current Outpatient Prescriptions on File Prior to Visit   Medication Sig   • albuterol (PROAIR HFA) 108 (90 BASE) MCG/ACT inhaler INHALE 1-2 PUFFS PO Q 4 H PRF WHEEZING   • gabapentin (NEURONTIN) 300 MG capsule TAKE ONE CAPSULE BY MOUTH TWO TIMES A DAY AND 2 CAPSULES AT BEDTIME.   • HYDROcodone-acetaminophen (NORCO) 7.5-325 MG per tablet Take 1 tablet by mouth Every 6 (Six) Hours As Needed for Moderate Pain .   • levonorgestrel-ethinyl estradiol (AVIANE,ALESSE,LESSINA) 0.1-20 MG-MCG per tablet Take  by mouth.   • Lurasidone HCl (LATUDA) 20 MG tablet tablet Take  by mouth.   • metFORMIN (GLUCOPHAGE) 500 MG tablet Take 1 tablet by mouth 2 (Two) Times a Day With Meals.   • [DISCONTINUED] nicotine (NICODERM CQ) 21 MG/24HR patch Place 1 patch on the skin Daily.     No current facility-administered medications on file prior to visit.        Past Medical History:   Diagnosis Date   • Anxiety    • Arthritis    • Asthma    • Bipolar 1 disorder    •  "Polycystic ovarian syndrome    • Sleep apnea      No past surgical history on file.  Social History     Occupational History   • Not on file.     Social History Main Topics   • Smoking status: Former Smoker     Packs/day: 0.50     Years: 20.00     Types: Cigarettes     Quit date: 8/1/2017   • Smokeless tobacco: Never Used   • Alcohol use Yes      Comment: social   • Drug use: No   • Sexual activity: Not on file      Social History     Social History Narrative     Family History   Problem Relation Age of Onset   • Depression Mother        Review of Systems: 14 point review of systems performed, positive pertinent findings identified in HPI. All remaining systems negative Except chest pain, abdominal pain, headaches, numbness and tingling, dizziness and mood swings    Review of Systems      Physical Exam:   Vitals:    09/20/17 1007   Temp: 97.8 °F (36.6 °C)   TempSrc: Temporal Artery    Weight: (!) 360 lb (163 kg)   Height: 60\" (152.4 cm)     Physical Exam   Constitutional: pleasant, well developed  she is seated in a motorized wheelchair  Eyes: sclera non icteric  Hearing : adequate for exam  Cardiovascular: palpable pulses in Bilateral foot,  bilateral calf/ thigh NT without sign of DVT  Respiratoy: breathing unlabored   Neurological: grossly sensate to LT throughout  bilateral LE  Psychiatric: oriented with normal mood and affect.   Lymphatic: No palpable popliteal lymphadenopathy  bilateral LE  Skin: intact throughout  bilateral leg/foot  Musculoskeletal: Examination of both knees shows mild effusion no warmth erythema there is moderate discomfort palpation of the medial joint line more so than laterally bilaterally.  There is discomfort with patellofemoral compression bilaterally left greater than right.  Both knees show exacerbation of pain with Светлана's bilaterally.  Physical Exam  Ortho Exam    Radiology: 3 views of both knees were ordered to evaluate pain reviewed and there are no prior x-rays available for " comparison.  There is some arthritic change mainly over the medial joint line and patellofemoral joint left greater than right with some questionable area of sclerosis over the medial compartment bilaterally.    Assessment/Plan: Bilateral knee pain.  Certainly her weight is a contributing factor but I worry given her weight and activities that she may have stress fractures or meniscal tears.  She's going to  limit activity but may continue with water exercises.  She was provided with a walker today to give her some support while walking.    We are going to get an MRI of both knees make sure there is no stress fracture or displaced meniscal tear.  She understands to call to schedule follow-up appointment once MRI has been scheduled

## 2017-09-21 ENCOUNTER — HOSPITAL ENCOUNTER (OUTPATIENT)
Dept: PHYSICAL THERAPY | Facility: HOSPITAL | Age: 32
Setting detail: THERAPIES SERIES
End: 2017-09-21

## 2017-09-22 ENCOUNTER — TELEPHONE (OUTPATIENT)
Dept: FAMILY MEDICINE CLINIC | Facility: CLINIC | Age: 32
End: 2017-09-22

## 2017-09-22 NOTE — TELEPHONE ENCOUNTER
----- Message from Vandana Nam sent at 9/22/2017  3:50 PM EDT -----  Regarding: SEDATION FOR MEDICATION  Patient uses [facility] and is scheduled for mri on Friday at 9:30 for 10:00 and is clausto and will need sedation.

## 2017-09-26 ENCOUNTER — HOSPITAL ENCOUNTER (OUTPATIENT)
Dept: PHYSICAL THERAPY | Facility: HOSPITAL | Age: 32
Setting detail: THERAPIES SERIES
End: 2017-09-26

## 2017-09-28 ENCOUNTER — APPOINTMENT (OUTPATIENT)
Dept: SLEEP MEDICINE | Facility: HOSPITAL | Age: 32
End: 2017-09-28

## 2017-09-28 ENCOUNTER — APPOINTMENT (OUTPATIENT)
Dept: PHYSICAL THERAPY | Facility: HOSPITAL | Age: 32
End: 2017-09-28

## 2017-10-03 ENCOUNTER — HOSPITAL ENCOUNTER (OUTPATIENT)
Dept: PHYSICAL THERAPY | Facility: HOSPITAL | Age: 32
Setting detail: THERAPIES SERIES
End: 2017-10-03

## 2017-10-05 ENCOUNTER — HOSPITAL ENCOUNTER (OUTPATIENT)
Dept: PHYSICAL THERAPY | Facility: HOSPITAL | Age: 32
Setting detail: THERAPIES SERIES
End: 2017-10-05

## 2017-10-17 ENCOUNTER — TELEPHONE (OUTPATIENT)
Dept: ORTHOPEDIC SURGERY | Facility: CLINIC | Age: 32
End: 2017-10-17

## 2017-10-17 DIAGNOSIS — M23.207 OLD TEAR OF MENISCUS OF LEFT KNEE, UNSPECIFIED MENISCUS, UNSPECIFIED TEAR TYPE: ICD-10-CM

## 2017-10-17 DIAGNOSIS — M23.206 OLD TEAR OF MENISCUS OF RIGHT KNEE, UNSPECIFIED MENISCUS, UNSPECIFIED TEAR TYPE: Primary | ICD-10-CM

## 2017-11-01 ENCOUNTER — TELEPHONE (OUTPATIENT)
Dept: FAMILY MEDICINE CLINIC | Facility: CLINIC | Age: 32
End: 2017-11-01

## 2017-11-01 NOTE — TELEPHONE ENCOUNTER
PATIENT IS REQUESTING MORE WATER PHYSICAL THERAPY.  PATIENT IS ALSO REQUESTING A LETTER FOR MEDICAL BUS TRANSPORTATION THAT SHE MUST BE TRANSPORTED WITH HER POWER WHEELCHAIR.     PLEASE CALL PATIENT [phone] WITH ANY QUESTIONS.    THANKS!

## 2017-11-03 NOTE — TELEPHONE ENCOUNTER
PATIENT SHOULD NOT NEED ANOTHER REFERRAL FOR AQUATIC PT, AS ONLY WENT TO 2 SESSIONS AND ORDER WAS PLACED A COUPLE MONTHS AGO. SHE DID CANCEL SEVERAL APPTS AND DID NOT COMPLETE PT. PLEASE FIND OUT MORE DETAILS.     ALSO I DISCUSSED WITH PATIENT AT LENGTH THAT I CANNOT WRITE A LETTER THAT SHE HAS TO BE TRANSPORTED WITH CHAIR. SHE WAS TO HAVE GAIT EVALUATION WITH PT TO DETERMINE MOST APPROPRIATE AMBULATION AID AND IF IT IS NECESSARY TO HAVE A MOTORIZED SCOOTER. ALSO COULD HAVE EVALUATION FROM ORTHOPEDIST IF THEY FEEL MOTORIZED SCOOTER IS NECESSARY. I DO NOT HAVE ANY INFORMATION THAT SHE HAS TO BE IN MOTORIZED SCOOTER AT ALL TIMES.

## 2017-11-09 ENCOUNTER — APPOINTMENT (OUTPATIENT)
Dept: SLEEP MEDICINE | Facility: HOSPITAL | Age: 32
End: 2017-11-09
Attending: INTERNAL MEDICINE

## 2017-11-14 ENCOUNTER — OFFICE VISIT (OUTPATIENT)
Dept: FAMILY MEDICINE CLINIC | Facility: CLINIC | Age: 32
End: 2017-11-14

## 2017-11-14 VITALS
OXYGEN SATURATION: 96 % | BODY MASS INDEX: 57.52 KG/M2 | HEART RATE: 94 BPM | WEIGHT: 293 LBS | SYSTOLIC BLOOD PRESSURE: 126 MMHG | TEMPERATURE: 99.4 F | HEIGHT: 60 IN | DIASTOLIC BLOOD PRESSURE: 88 MMHG

## 2017-11-14 DIAGNOSIS — G47.33 OSA TREATED WITH BIPAP: ICD-10-CM

## 2017-11-14 DIAGNOSIS — R73.03 PREDIABETES: Primary | ICD-10-CM

## 2017-11-14 DIAGNOSIS — Z23 ENCOUNTER FOR IMMUNIZATION: ICD-10-CM

## 2017-11-14 DIAGNOSIS — E28.2 PCOS (POLYCYSTIC OVARIAN SYNDROME): ICD-10-CM

## 2017-11-14 DIAGNOSIS — Z00.00 HEALTH CARE MAINTENANCE: ICD-10-CM

## 2017-11-14 LAB
ALBUMIN SERPL-MCNC: 4 G/DL (ref 3.5–5.2)
ALBUMIN/GLOB SERPL: 1.2 G/DL
ALP SERPL-CCNC: 124 U/L (ref 39–117)
ALT SERPL-CCNC: 27 U/L (ref 1–33)
AST SERPL-CCNC: 21 U/L (ref 1–32)
BASOPHILS # BLD AUTO: 0.02 10*3/MM3 (ref 0–0.2)
BASOPHILS NFR BLD AUTO: 0.2 % (ref 0–1.5)
BILIRUB SERPL-MCNC: <0.2 MG/DL (ref 0.1–1.2)
BUN SERPL-MCNC: 17 MG/DL (ref 6–20)
BUN/CREAT SERPL: 27.4 (ref 7–25)
CALCIUM SERPL-MCNC: 9.6 MG/DL (ref 8.6–10.5)
CHLORIDE SERPL-SCNC: 99 MMOL/L (ref 98–107)
CO2 SERPL-SCNC: 28.1 MMOL/L (ref 22–29)
CREAT SERPL-MCNC: 0.62 MG/DL (ref 0.57–1)
EOSINOPHIL # BLD AUTO: 0.15 10*3/MM3 (ref 0–0.7)
EOSINOPHIL NFR BLD AUTO: 1.3 % (ref 0.3–6.2)
ERYTHROCYTE [DISTWIDTH] IN BLOOD BY AUTOMATED COUNT: 13.3 % (ref 11.7–13)
EXPIRATION DATE: NORMAL
FOLATE SERPL-MCNC: 4.43 NG/ML (ref 4.78–24.2)
GFR SERPLBLD CREATININE-BSD FMLA CKD-EPI: 111 ML/MIN/1.73
GFR SERPLBLD CREATININE-BSD FMLA CKD-EPI: 134 ML/MIN/1.73
GLOBULIN SER CALC-MCNC: 3.4 GM/DL
GLUCOSE SERPL-MCNC: 105 MG/DL (ref 65–99)
HBA1C MFR BLD: 6.1 %
HCT VFR BLD AUTO: 46.9 % (ref 35.6–45.5)
HGB BLD-MCNC: 14.5 G/DL (ref 11.9–15.5)
IMM GRANULOCYTES # BLD: 0.02 10*3/MM3 (ref 0–0.03)
IMM GRANULOCYTES NFR BLD: 0.2 % (ref 0–0.5)
LYMPHOCYTES # BLD AUTO: 2.67 10*3/MM3 (ref 0.9–4.8)
LYMPHOCYTES NFR BLD AUTO: 22.6 % (ref 19.6–45.3)
Lab: 775
MCH RBC QN AUTO: 30.2 PG (ref 26.9–32)
MCHC RBC AUTO-ENTMCNC: 30.9 G/DL (ref 32.4–36.3)
MCV RBC AUTO: 97.7 FL (ref 80.5–98.2)
MONOCYTES # BLD AUTO: 0.81 10*3/MM3 (ref 0.2–1.2)
MONOCYTES NFR BLD AUTO: 6.9 % (ref 5–12)
NEUTROPHILS # BLD AUTO: 8.12 10*3/MM3 (ref 1.9–8.1)
NEUTROPHILS NFR BLD AUTO: 68.8 % (ref 42.7–76)
PLATELET # BLD AUTO: 213 10*3/MM3 (ref 140–500)
POTASSIUM SERPL-SCNC: 4.4 MMOL/L (ref 3.5–5.2)
PROT SERPL-MCNC: 7.4 G/DL (ref 6–8.5)
RBC # BLD AUTO: 4.8 10*6/MM3 (ref 3.9–5.2)
SODIUM SERPL-SCNC: 141 MMOL/L (ref 136–145)
VIT B12 SERPL-MCNC: 404 PG/ML (ref 211–946)
WBC # BLD AUTO: 11.79 10*3/MM3 (ref 4.5–10.7)

## 2017-11-14 PROCEDURE — 90686 IIV4 VACC NO PRSV 0.5 ML IM: CPT | Performed by: PHYSICIAN ASSISTANT

## 2017-11-14 PROCEDURE — 83036 HEMOGLOBIN GLYCOSYLATED A1C: CPT | Performed by: PHYSICIAN ASSISTANT

## 2017-11-14 PROCEDURE — 90471 IMMUNIZATION ADMIN: CPT | Performed by: PHYSICIAN ASSISTANT

## 2017-11-14 PROCEDURE — 99214 OFFICE O/P EST MOD 30 MIN: CPT | Performed by: PHYSICIAN ASSISTANT

## 2017-11-14 NOTE — PROGRESS NOTES
"Subjective   Catherine Grady is a 32 y.o. female seen today for medication check for diabetes, requesting a referral to GI for diarrhea and abdominal pain after eating     History of Present Illness     TAKING MEDICATION AS DIRECTED. NO AE TO MEDICATION THAT SHE IS AWARE. PAIN IN THE MIDDLE OF HER ABDOMEN- SEVERE PAIN ONLY AFTER EATING. DOES HAVE PAIN THROUGHOUT THE DAY IN MIDDLE OF ABDOMEN. WENT TO ER AT [facility] LAST SUMMER AND WAS TOLD HAD GALLSTONES AND DID NOT HAVE SURGERY.     HAS BEEN OFF CPAP AND O2 FOR 2 MONTHS AND HASN'T SEEN DR LARSON FOR 6 MONTHS.   [facility] HAS MISSED APPT AND MRI APPTS.     STATES FATHER HASN'T TALKED TO PATIENT IN 7 YEARS. STATES HE DIDN'T LIKE WHO SHE WAS DATING. SHE IS NO LONGER DATING THAT PERSON AND WANTS \"CLOSURE\" FROM FATHER.     DOES NOT NEED REFERRAL TO [facility]- WILL NEED TO SEE [facility] IN [place]- HAS APPT WITH [facility] 11/21/17. WILL KEEP APPT. HAS CONSIDERED GOING BACK TO [facility].     The following portions of the patient's history were reviewed and updated as appropriate: allergies, current medications, past family history, past medical history, past social history, past surgical history and problem list.    Review of Systems   Gastrointestinal: Positive for abdominal pain and diarrhea.   All other systems reviewed and are negative.      Objective   Physical Exam   Constitutional: She is oriented to person, place, and time. She appears well-developed and well-nourished.   HENT:   Head: Normocephalic and atraumatic.   Right Ear: External ear normal.   Left Ear: External ear normal.   Nose: Nose normal.   Eyes: Conjunctivae are normal.   Cardiovascular: Normal rate, regular rhythm, normal heart sounds and intact distal pulses.  Exam reveals no gallop and no friction rub.    No murmur heard.  Pulmonary/Chest: Effort normal and breath sounds normal. No respiratory distress. She has no wheezes. She has no rhonchi. She has no rales.   Abdominal: Soft. Normal " appearance and bowel sounds are normal. She exhibits no shifting dullness, no distension, no abdominal bruit and no mass. There is no hepatosplenomegaly. There is tenderness in the right upper quadrant. There is positive Casas's sign. There is no rigidity, no rebound, no guarding and no CVA tenderness. No hernia.   Neurological: She is alert and oriented to person, place, and time.   Skin: Skin is warm and dry. She is not diaphoretic.   Psychiatric: She has a normal mood and affect. Her behavior is normal. Judgment and thought content normal.   Nursing note and vitals reviewed.      Assessment/Plan   Catherine was seen today for medication check and requesting referral to gi.    Diagnoses and all orders for this visit:    Prediabetes  -     POCT glycated hemoglobin, total  -     CBC & Differential  -     Comprehensive Metabolic Panel  -     Vitamin B12 & Folate    Health care maintenance  -     Flu Vaccine Quad PF 3YR+ (FLUARIX/FLUZONE [id number])  -     CBC & Differential  -     Comprehensive Metabolic Panel  -     Vitamin B12 & Folate    Encounter for immunization   -     Flu Vaccine Quad PF 3YR+ (FLUARIX/FLUZONE [id number])  -     CBC & Differential  -     Comprehensive Metabolic Panel  -     Vitamin B12 & Folate    PCOS (polycystic ovarian syndrome)  -     CBC & Differential  -     Comprehensive Metabolic Panel  -     Vitamin B12 & Folate    KIRA treated with autoBiPAP  -     CBC & Differential  -     Comprehensive Metabolic Panel  -     Vitamin B12 & Folate      Patient Instructions   32 YEAR OLD FEMALE WHO PRESENTS TODAY IN FOLLOW UP OF DIABETES, PCOS. SHE IS TAKING MEDICATION AS DIRECTED. SHE DENIES AE, HOWEVER, SHE IS HAVING ABDOMINAL PAIN AND NAUSEA. THIS IS NOT CONSISTENT WITH DOSING. PATIENT REPORTS PREVIOUS ER VISIT THAT SHE HAD WITH ABDOMINAL PAIN AND WAS TOLD CHOLELITHIASIS AND THAT SHE NEEDED CHOLECYSTECTOMY. PATIENT DID NOT SEE SURGEON FOR THIS. SHE HAS PAIN BUT SEVERE EPISODES OF PAIN ARE ONLY AFTER EATING.  PATIENT WITH POSITIVE RUQ TTP AND POSITIVE SCHILLING SIGN. I WILL TRY TO OBTAIN RECORDS FROM [facility] TO SEE IF SHE NEEDS ADDITIONAL IMAGING PRIOR TO GENERAL SURGERY REFERRAL. TO BE SEEN HERE OR ER ASAP IF WORSENING OR NEW SYMPTOMS. A1C IS DOWN FROM 6.6-6.1%, PATIENT TO CONTINUE MEDICATION.     PATIENT HAS BEEN MISSING APPTS. SHE HAS BEEN UNABLE TO SEE PSYCHOLOGIST IN [facility], SO SHE HAS APPT WITH [facility] IN [place]. SHE HAS NOT USED CPAP IN 2 MONTHS AND HASN'T SEEN DR LARSON IN 6 MONTHS. SHE WILL SCHEDULE FOLLOW UP THERE. PATIENT DID NOT ATTEND PHYSICAL THERAPY SESSIONS, BUT SHE WILL CALL TO RESTART. SHE MISSED APPT FOR MRI KNEES ORDERED BY ORTHOPEDIST BUT WILL RESCHEDULE. SHE WILL CALL ORTHO TO ENSURE NO PRE-CERT CHANGE IS NEEDED. PATIENT TO BE COMPLIANT WITH DIET AND EXERCISE AS WELL AS THERAPY AND MEDICAL APPTS. LABS TODAY- IF STABLE- FOLLOW UP IN 3 MONTHS WITH ME. FOLLOW UP SOONER IF NEEDED.

## 2017-11-15 ENCOUNTER — TELEPHONE (OUTPATIENT)
Dept: ORTHOPEDIC SURGERY | Facility: CLINIC | Age: 32
End: 2017-11-15

## 2017-11-15 DIAGNOSIS — E53.8 FOLATE DEFICIENCY: Primary | ICD-10-CM

## 2017-11-15 RX ORDER — FOLIC ACID 1 MG/1
1 TABLET ORAL DAILY
Qty: 30 TABLET | Refills: 0 | Status: SHIPPED | OUTPATIENT
Start: 2017-11-15 | End: 2022-05-21

## 2017-11-16 ENCOUNTER — TELEPHONE (OUTPATIENT)
Dept: FAMILY MEDICINE CLINIC | Facility: CLINIC | Age: 32
End: 2017-11-16

## 2017-11-16 DIAGNOSIS — M54.50 PAIN OF LUMBOSACRAL SPINE: ICD-10-CM

## 2017-11-16 DIAGNOSIS — G89.29 CHRONIC PAIN OF BOTH KNEES: ICD-10-CM

## 2017-11-16 DIAGNOSIS — M25.562 CHRONIC PAIN OF BOTH KNEES: ICD-10-CM

## 2017-11-16 DIAGNOSIS — F30.9 BIPOLAR I DISORDER, SINGLE MANIC EPISODE (HCC): ICD-10-CM

## 2017-11-16 DIAGNOSIS — M25.561 CHRONIC PAIN OF BOTH KNEES: ICD-10-CM

## 2017-11-16 DIAGNOSIS — E66.01 MORBID OBESITY (HCC): Primary | ICD-10-CM

## 2017-11-16 NOTE — TELEPHONE ENCOUNTER
Pt called and she is needing a new order for the aquatic physical therapy,she wants to go to the place Vandana referred her the first time, she is scheduled for an MRI on 11-24-17  Her best call back is [phone]

## 2017-11-17 NOTE — TELEPHONE ENCOUNTER
PLEASE ADDRESS. SEE PREVIOUS MESSAGE. SHE ALREADY HAD REFERRAL THAT SHOULD STILL BE GOOD AND SHE WAS SEEN IN OFFICE AND REPORTED TO ME THAT SHE KNEW SHE JUST NEEDED TO CALL AND SCHEDULE FOLLOW UP APPT WITH THEM (MISSED APPTS AND DID NOT COMPLETE PT) .PLEASE LET ME KNOW IF THERE IS ANYTHING DIFFERENT NEEDED. THE MRI WAS ORDERED BY ORTHOPEDIST.

## 2017-11-17 NOTE — TELEPHONE ENCOUNTER
Spoke with patient and confirmed with [facility] that after 30 days there charts are released back to the hospital so a new order is required.  So if you can place a new order she will be able to call and get herself rescheduled.

## 2017-11-20 ENCOUNTER — TELEPHONE (OUTPATIENT)
Dept: ORTHOPEDIC SURGERY | Facility: CLINIC | Age: 32
End: 2017-11-20

## 2017-11-24 ENCOUNTER — APPOINTMENT (OUTPATIENT)
Dept: MRI IMAGING | Facility: HOSPITAL | Age: 32
End: 2017-11-24
Attending: ORTHOPAEDIC SURGERY

## 2017-11-25 DIAGNOSIS — E11.9 TYPE 2 DIABETES MELLITUS WITHOUT COMPLICATION, WITHOUT LONG-TERM CURRENT USE OF INSULIN (HCC): ICD-10-CM

## 2017-11-27 ENCOUNTER — HOSPITAL ENCOUNTER (OUTPATIENT)
Dept: MRI IMAGING | Facility: HOSPITAL | Age: 32
Discharge: HOME | End: 2017-11-27
Attending: ORTHOPAEDIC SURGERY | Admitting: ORTHOPAEDIC SURGERY

## 2017-11-27 DIAGNOSIS — M23.207 OLD TEAR OF MENISCUS OF LEFT KNEE, UNSPECIFIED MENISCUS, UNSPECIFIED TEAR TYPE: ICD-10-CM

## 2017-11-27 DIAGNOSIS — M23.206 OLD TEAR OF MENISCUS OF RIGHT KNEE, UNSPECIFIED MENISCUS, UNSPECIFIED TEAR TYPE: ICD-10-CM

## 2017-11-27 PROCEDURE — 73721 MRI JNT OF LWR EXTRE W/O DYE: CPT

## 2017-11-29 ENCOUNTER — OFFICE VISIT (OUTPATIENT)
Dept: ORTHOPEDIC SURGERY | Facility: CLINIC | Age: 32
End: 2017-11-29

## 2017-11-29 VITALS — BODY MASS INDEX: 57.52 KG/M2 | WEIGHT: 293 LBS | HEIGHT: 60 IN | TEMPERATURE: 97.6 F

## 2017-11-29 DIAGNOSIS — M25.561 CHRONIC PAIN OF BOTH KNEES: ICD-10-CM

## 2017-11-29 DIAGNOSIS — S83.242D TEAR OF MEDIAL MENISCUS OF LEFT KNEE, CURRENT, UNSPECIFIED TEAR TYPE, SUBSEQUENT ENCOUNTER: ICD-10-CM

## 2017-11-29 DIAGNOSIS — E66.01 MORBID OBESITY DUE TO EXCESS CALORIES (HCC): ICD-10-CM

## 2017-11-29 DIAGNOSIS — M17.0 ARTHRITIS OF BOTH KNEES: Primary | ICD-10-CM

## 2017-11-29 DIAGNOSIS — G89.29 CHRONIC PAIN OF BOTH KNEES: ICD-10-CM

## 2017-11-29 DIAGNOSIS — M25.562 CHRONIC PAIN OF BOTH KNEES: ICD-10-CM

## 2017-11-29 PROCEDURE — 20610 DRAIN/INJ JOINT/BURSA W/O US: CPT | Performed by: ORTHOPAEDIC SURGERY

## 2017-11-29 PROCEDURE — 99214 OFFICE O/P EST MOD 30 MIN: CPT | Performed by: ORTHOPAEDIC SURGERY

## 2017-11-29 RX ORDER — MELOXICAM 15 MG/1
TABLET ORAL
Qty: 60 TABLET | Refills: 0 | Status: SHIPPED | OUTPATIENT
Start: 2017-11-29 | End: 2022-05-21

## 2017-11-29 RX ADMIN — METHYLPREDNISOLONE ACETATE 80 MG: 80 INJECTION, SUSPENSION INTRA-ARTICULAR; INTRALESIONAL; INTRAMUSCULAR; SOFT TISSUE at 10:34

## 2017-11-29 RX ADMIN — BUPIVACAINE HYDROCHLORIDE 4 ML: 5 INJECTION, SOLUTION EPIDURAL; INTRACAUDAL at 10:34

## 2017-11-30 RX ORDER — BUPIVACAINE HYDROCHLORIDE 5 MG/ML
4 INJECTION, SOLUTION EPIDURAL; INTRACAUDAL
Status: COMPLETED | OUTPATIENT
Start: 2017-11-29 | End: 2017-11-29

## 2017-11-30 RX ORDER — METHYLPREDNISOLONE ACETATE 80 MG/ML
80 INJECTION, SUSPENSION INTRA-ARTICULAR; INTRALESIONAL; INTRAMUSCULAR; SOFT TISSUE
Status: COMPLETED | OUTPATIENT
Start: 2017-11-29 | End: 2017-11-29

## 2017-11-30 NOTE — PROGRESS NOTES
"Knee Exam      Patient: Catherine Grady    YOB: 1985 32 y.o. female    Chief Complaints: Right knee feels good left knee hurts    History of Present Illness: Patient was seen on 9/20/17 with a one-year history of severe constant aching pain with feelings of catching and popping in the left knee more so than the right.  At that time we ordered MRIs of both knees however she said her right knee feels much better now she did not have the MRI on it.  She still has similar complaints to left knee..  She's not yet done any physical therapy or aqua therapy at [facility] and she uses her walker around the house and uses a motorized wheelchair when she is out of the house.  HPI    ROS: knee pain no chest pain fevers or chills  Past Medical History:   Diagnosis Date   • Anxiety    • Arthritis    • Asthma    • Bipolar 1 disorder    • Polycystic ovarian syndrome    • Sleep apnea        Physical Exam:   Vitals:    11/29/17 1007   Temp: 97.6 °F (36.4 °C)   Weight: (!) 350 lb (159 kg)   Height: 60\" (152.4 cm)     Well developed with normal mood.Right knee shows no warmth erythema with full range of motion minimal discomfort over the medial joint line.  Left knee showed moderate diffuse discomfort but no warmth erythema there was mild effusion.  She had full extension and about 110° of flexion.  There was moderate discomfort over the medial more so than lateral joint line as well as discomfort with patellofemoral compression there is some discomfort with Светлана's.  Left calf is nontender without sign of DVT      Radiology: MRI films and report of the left knee dated 11/27/17 were reviewed shows moderate medial and patellofemoral compartment arthritis with some degenerative blunting of the free edge of the medial meniscal body which is partially extruded there is some increased signal within the free edge of the posterior horn the medial meniscus thought to be degenerative tear but no displacement.  There is a joint " effusion with baker cyst but no obvious fracture.  There is subchondral marrow edema within the medial aspect of the medial tibial plateau      Assessment/Plan:  1.  Right knee arthritis this seems be doing well at this time she'll having significant complaints in the right knee.  2.  Left knee medial and patellofemoral arthritis with some subchondral marrow edema and degenerative tear of the meniscus.    I discussed with her given her global feelings of aching and pain as well as arthritic change I would not recommend surgical treatment at this time as a don't like is likely to help with her condition.    I really feel like weight loss is can be the best thing for her and she is in agreement and would like to be seen for evaluation for possible bariatric surgery.  Referral was made to Dr. Jacinto.    She will begin physical therapy at [facility] including aqua therapy and also placed her on meloxicam 15 mg one by mouth daily with GI precautions #30 with 1 refill.  She understands if she needs further refills beyond that she'll need to see her primary care physician for evaluation of liver and kidney function.    She was also fitted with a medial heel wedge to try to help unload this area.  We discussed other treatment options after verbal consent and sterile preparation discussion of risks which can include infection left knee was injected with steroid.    I'll see her back in 3 months x-rays of her left knee    Large Joint Arthrocentesis  Date/Time: 11/29/2017 10:34 AM  Consent given by: patient  Site marked: site marked  Timeout: Immediately prior to procedure a time out was called to verify the correct patient, procedure, equipment, support staff and site/side marked as required   Supporting Documentation  Indications: pain   Procedure Details  Location: knee - L knee  Preparation: Patient was prepped and draped in the usual sterile fashion  Needle size: 22 G  Approach: anteromedial  Medications administered: 80  mg methylPREDNISolone acetate 80 MG/ML; 4 mL bupivacaine (PF) 0.5 %  Patient tolerance: patient tolerated the procedure well with no immediate complications

## 2017-12-04 ENCOUNTER — APPOINTMENT (OUTPATIENT)
Dept: ULTRASOUND IMAGING | Facility: HOSPITAL | Age: 32
End: 2017-12-04

## 2017-12-06 ENCOUNTER — APPOINTMENT (OUTPATIENT)
Dept: PHYSICAL THERAPY | Facility: HOSPITAL | Age: 32
End: 2017-12-06

## 2017-12-13 ENCOUNTER — OFFICE VISIT (OUTPATIENT)
Dept: FAMILY MEDICINE CLINIC | Facility: CLINIC | Age: 32
End: 2017-12-13

## 2017-12-13 VITALS
BODY MASS INDEX: 57.52 KG/M2 | TEMPERATURE: 98 F | WEIGHT: 293 LBS | SYSTOLIC BLOOD PRESSURE: 124 MMHG | HEART RATE: 81 BPM | DIASTOLIC BLOOD PRESSURE: 82 MMHG | HEIGHT: 60 IN | OXYGEN SATURATION: 97 %

## 2017-12-13 DIAGNOSIS — D72.828 OTHER ELEVATED WHITE BLOOD CELL (WBC) COUNT: Primary | ICD-10-CM

## 2017-12-13 DIAGNOSIS — J45.30 MILD PERSISTENT ASTHMA WITHOUT COMPLICATION: Primary | ICD-10-CM

## 2017-12-13 DIAGNOSIS — R74.8 ELEVATED ALKALINE PHOSPHATASE LEVEL: ICD-10-CM

## 2017-12-13 DIAGNOSIS — E53.8 FOLATE DEFICIENCY: ICD-10-CM

## 2017-12-13 DIAGNOSIS — G56.03 BILATERAL CARPAL TUNNEL SYNDROME: ICD-10-CM

## 2017-12-13 LAB
ALBUMIN SERPL-MCNC: 4 G/DL (ref 3.5–5.2)
ALBUMIN/GLOB SERPL: 1.1 G/DL
ALP SERPL-CCNC: 131 U/L (ref 39–117)
ALT SERPL-CCNC: 30 U/L (ref 1–33)
AST SERPL-CCNC: 15 U/L (ref 1–32)
BASOPHILS # BLD AUTO: 0.01 10*3/MM3 (ref 0–0.2)
BASOPHILS NFR BLD AUTO: 0.1 % (ref 0–1.5)
BILIRUB SERPL-MCNC: 0.3 MG/DL (ref 0.1–1.2)
BUN SERPL-MCNC: 12 MG/DL (ref 6–20)
BUN/CREAT SERPL: 18.8 (ref 7–25)
CALCIUM SERPL-MCNC: 9.4 MG/DL (ref 8.6–10.5)
CHLORIDE SERPL-SCNC: 99 MMOL/L (ref 98–107)
CO2 SERPL-SCNC: 26.8 MMOL/L (ref 22–29)
CREAT SERPL-MCNC: 0.64 MG/DL (ref 0.57–1)
EOSINOPHIL # BLD AUTO: 0.18 10*3/MM3 (ref 0–0.7)
EOSINOPHIL NFR BLD AUTO: 1.6 % (ref 0.3–6.2)
ERYTHROCYTE [DISTWIDTH] IN BLOOD BY AUTOMATED COUNT: 14 % (ref 11.7–13)
FOLATE SERPL-MCNC: >20 NG/ML (ref 4.78–24.2)
GFR SERPLBLD CREATININE-BSD FMLA CKD-EPI: 107 ML/MIN/1.73
GFR SERPLBLD CREATININE-BSD FMLA CKD-EPI: 130 ML/MIN/1.73
GLOBULIN SER CALC-MCNC: 3.6 GM/DL
GLUCOSE SERPL-MCNC: 65 MG/DL (ref 65–99)
HCT VFR BLD AUTO: 44.3 % (ref 35.6–45.5)
HGB BLD-MCNC: 13.6 G/DL (ref 11.9–15.5)
IMM GRANULOCYTES # BLD: 0.03 10*3/MM3 (ref 0–0.03)
IMM GRANULOCYTES NFR BLD: 0.3 % (ref 0–0.5)
LYMPHOCYTES # BLD AUTO: 2.83 10*3/MM3 (ref 0.9–4.8)
LYMPHOCYTES NFR BLD AUTO: 25.7 % (ref 19.6–45.3)
MCH RBC QN AUTO: 30.6 PG (ref 26.9–32)
MCHC RBC AUTO-ENTMCNC: 30.7 G/DL (ref 32.4–36.3)
MCV RBC AUTO: 99.6 FL (ref 80.5–98.2)
MONOCYTES # BLD AUTO: 0.77 10*3/MM3 (ref 0.2–1.2)
MONOCYTES NFR BLD AUTO: 7 % (ref 5–12)
NEUTROPHILS # BLD AUTO: 7.18 10*3/MM3 (ref 1.9–8.1)
NEUTROPHILS NFR BLD AUTO: 65.3 % (ref 42.7–76)
PLATELET # BLD AUTO: 221 10*3/MM3 (ref 140–500)
POTASSIUM SERPL-SCNC: 4.6 MMOL/L (ref 3.5–5.2)
PROT SERPL-MCNC: 7.6 G/DL (ref 6–8.5)
RBC # BLD AUTO: 4.45 10*6/MM3 (ref 3.9–5.2)
SODIUM SERPL-SCNC: 140 MMOL/L (ref 136–145)
VIT B12 SERPL-MCNC: 344 PG/ML (ref 211–946)
WBC # BLD AUTO: 11 10*3/MM3 (ref 4.5–10.7)

## 2017-12-13 PROCEDURE — 99213 OFFICE O/P EST LOW 20 MIN: CPT | Performed by: PHYSICIAN ASSISTANT

## 2017-12-13 RX ORDER — ALBUTEROL SULFATE 90 UG/1
2 AEROSOL, METERED RESPIRATORY (INHALATION) EVERY 6 HOURS PRN
Qty: 1 INHALER | Refills: 0 | Status: SHIPPED | OUTPATIENT
Start: 2017-12-13

## 2017-12-13 NOTE — PROGRESS NOTES
"Subjective   Catherine Grady is a 32 y.o. female seen today with bilateral hand numbness, worse with gripping anything for 1 week     History of Present Illness     REPORTS NUMBNESS IN BILATERAL HANDS THAT STARTED SUDDENLY < 1 WEEK AGO, FEELS LIKE COTTON IS IN HANDS/FUZZY. PATIENT REPORTS SHE WILL BE HOLDING SOMETHING AND DROPS IT \"ANY TIME\". INTERMITTENT SYMPTOMS. NO CONSTANT NUMBNESS. PALMAR SURFACE AND THE TIPS OF ALL FINGERS WITH THE EXCEPTION OF THE THUMBS. NO UPPER ARM OR FOREARM NUMBNESS OR SYMPTOMS.     SEEN BY ORTHOPEDIST- HAD INJECTIONS- ARTHRITIS AND DEGENERATIVE MENISCUS TEAR. WILL RESTART PT AND WILL FOLLOW UP 3 MONTHS.    The following portions of the patient's history were reviewed and updated as appropriate: allergies, current medications, past family history, past medical history, past social history, past surgical history and problem list.    Review of Systems   Musculoskeletal:        Bilateral hand numbness    All other systems reviewed and are negative.      Objective   Physical Exam   Constitutional: She is oriented to person, place, and time. She appears well-developed and well-nourished.   HENT:   Head: Normocephalic and atraumatic.   Right Ear: External ear normal.   Left Ear: External ear normal.   Nose: Nose normal.   Eyes: Conjunctivae and lids are normal.   Neck: Neck supple. Carotid bruit is not present.   Cardiovascular: Normal rate and intact distal pulses.    Pulmonary/Chest: Effort normal. She has no rhonchi.   Musculoskeletal: She exhibits no edema or deformity.   BILATERAL HANDS- NORMAL STRENGTH. DECREASED SENSATION BILATERAL THENAR EMINENCE BUT NORMAL SENSATION OTHERWISE. NEGATIVE PHALEN AND TINEL.    Neurological: She is alert and oriented to person, place, and time. Gait normal.   Skin: Skin is warm and dry.   Psychiatric: She has a normal mood and affect. Her speech is normal and behavior is normal. Judgment and thought content normal. Cognition and memory are normal.   Nursing " "note and vitals reviewed.      Assessment/Plan   Catherine was seen today for bilateral hand numbness.    Diagnoses and all orders for this visit:    Mild persistent asthma without complication  -     albuterol (PROAIR HFA) 108 (90 Base) MCG/ACT inhaler; Inhale 2 puffs Every 6 (Six) Hours As Needed for Wheezing.    Bilateral carpal tunnel syndrome  -     Elastic Bandages & Supports (CARPAL TUNNEL WRIST DELUXE) misc; 2 each Daily.      Patient Instructions   32 YEAR OLD FEMALE WHO PRESENTS TODAY WITH ABOUT 1 WEEK HISTORY OF INTERMITTENT NUMBNESS OF BILATERAL HANDS- PALMAR SURFACE AND TIPS OF \"ALL FINGERS EXCEPT\" THE THUMBS. SYMPTOMS ARE INTERMITTENT AND NORMAL EXAM TODAY. I WILL HAVE HER TRY CARPAL TUNNEL BRACES AT NIGHT AND IF ANY REPETITIVE TASKS. TO TRY TO AVOID REPETITIVE TASKS WITH HANDS FOR 2-4 WEEKS. TO CALL OR RETURN IF NO IMPROVEMENT, WORSENING, OR CHANGING SYMPTOMS. SHE DOES NEED RF OF ALBUTEROL INHALER FOR ASTHMA. I WILL REFILL TODAY.              "

## 2017-12-14 ENCOUNTER — HOSPITAL ENCOUNTER (OUTPATIENT)
Dept: ULTRASOUND IMAGING | Facility: HOSPITAL | Age: 32
Discharge: HOME | End: 2017-12-14

## 2017-12-20 ENCOUNTER — APPOINTMENT (OUTPATIENT)
Dept: PHYSICAL THERAPY | Facility: HOSPITAL | Age: 32
End: 2017-12-20

## 2017-12-20 NOTE — PATIENT INSTRUCTIONS
"32 YEAR OLD FEMALE WHO PRESENTS TODAY WITH ABOUT 1 WEEK HISTORY OF INTERMITTENT NUMBNESS OF BILATERAL HANDS- PALMAR SURFACE AND TIPS OF \"ALL FINGERS EXCEPT\" THE THUMBS. SYMPTOMS ARE INTERMITTENT AND NORMAL EXAM TODAY. I WILL HAVE HER TRY CARPAL TUNNEL BRACES AT NIGHT AND IF ANY REPETITIVE TASKS. TO TRY TO AVOID REPETITIVE TASKS WITH HANDS FOR 2-4 WEEKS. TO CALL OR RETURN IF NO IMPROVEMENT, WORSENING, OR CHANGING SYMPTOMS. SHE DOES NEED RF OF ALBUTEROL INHALER FOR ASTHMA. I WILL REFILL TODAY.   "

## 2017-12-22 ENCOUNTER — HOSPITAL ENCOUNTER (OUTPATIENT)
Dept: ULTRASOUND IMAGING | Facility: HOSPITAL | Age: 32
Discharge: HOME | End: 2017-12-22
Admitting: PHYSICIAN ASSISTANT

## 2017-12-22 PROCEDURE — 76705 ECHO EXAM OF ABDOMEN: CPT

## 2017-12-27 DIAGNOSIS — K80.20 CALCULUS OF GALLBLADDER WITHOUT CHOLECYSTITIS WITHOUT OBSTRUCTION: Primary | ICD-10-CM

## 2017-12-29 ENCOUNTER — TELEPHONE (OUTPATIENT)
Dept: FAMILY MEDICINE CLINIC | Facility: CLINIC | Age: 32
End: 2017-12-29

## 2017-12-29 NOTE — TELEPHONE ENCOUNTER
Patient states [facility] doesn't have the B 12 100 mcg only 500 mcg or 1000 mcg, wants to know what she should purchase?

## 2018-01-02 RX ORDER — CHOLECALCIFEROL (VITAMIN D3) 125 MCG
CAPSULE ORAL
Qty: 12 TABLET | Refills: 3 | Status: SHIPPED | OUTPATIENT
Start: 2018-01-02

## 2018-01-11 ENCOUNTER — OFFICE VISIT (OUTPATIENT)
Dept: SLEEP MEDICINE | Facility: HOSPITAL | Age: 33
End: 2018-01-11
Attending: INTERNAL MEDICINE

## 2018-01-11 DIAGNOSIS — IMO0001 CLASS 3 OBESITY DUE TO EXCESS CALORIES WITH SERIOUS COMORBIDITY AND BODY MASS INDEX (BMI) OF 50.0 TO 59.9 IN ADULT: ICD-10-CM

## 2018-01-11 DIAGNOSIS — G47.33 OSA TREATED WITH BIPAP: Primary | ICD-10-CM

## 2018-01-11 DIAGNOSIS — G47.14 HYPERSOMNIA DUE TO MEDICAL CONDITION: ICD-10-CM

## 2018-01-11 DIAGNOSIS — IMO0002 SLEEP-RELATED HYPOVENTILATION: ICD-10-CM

## 2018-01-11 PROCEDURE — 99213 OFFICE O/P EST LOW 20 MIN: CPT | Performed by: INTERNAL MEDICINE

## 2018-01-11 PROCEDURE — G0463 HOSPITAL OUTPT CLINIC VISIT: HCPCS

## 2018-01-11 NOTE — PROGRESS NOTES
[facility] Note       Patient Care Team:  DEVON Rome as PCP - General (Family Medicine)  David Sheldon MD as Emergency Attending (Internal Medicine)  Beau Niño MD as Consulting Physician (Sleep Medicine)  Wendy Lowe MD as Consulting Physician (General Surgery)    Chief Complaint:  KIRA     Interval History:   The patient was last seen by me in June 2017.  She states she is unchanged.  She goes to bed between 10 and 11 PM and awakens between 10 and 11 AM.  She gets up 3-4 times to go to the bathroom.  Her Wadley Sleepiness Scale is abnormal at 20.    She has gallstones and reports she will be having her gallbladder removed.    Review of Systems:  Recorded on the Sleep Questionnaire.  Unremarkable .    Social History:  She will have greater than 10 caffeinated beverages a day.  Social History     Social History   • Marital status: Single     Spouse name: N/A   • Number of children: N/A   • Years of education: N/A     Social History Main Topics   • Smoking status: Current Every Day Smoker     Packs/day: 0.25     Years: 20.00     Types: Cigarettes     Last attempt to quit: 8/1/2017   • Smokeless tobacco: Never Used   • Alcohol use Yes      Comment: social   • Drug use: No   • Sexual activity: Not on file     Other Topics Concern   • Not on file     Social History Narrative   • No narrative on file       Allergies:  Review of patient's allergies indicates no known allergies.     Medication Review:  Reviewed.      Vital Signs:  Height 60 inches and weight 350 pounds and she is morbidly obese with a body mass index of greater than 54.    Physical Exam:    Constitutional:  Well developed white female and appears in no apparent distress.  Awake & oriented times 3.  Normal mood with normal recent and remote memory and normal judgement.  Eyes:  Conjunctivae normal.  Oropharynx:  moist mucous membranes without exudate and a large tongue with class III MP airway and posterior  pharyngeal region not well seen.      Results Review:  DME is [facility] and she uses a fullface mask..  Downloads between June 24, 2017 in December 20, 2017 compliances only 42%.  Average usage is 1 hours and 29 minutes.  Average AHI is normal without a significant leak.  Average AutoBiPAP pressure is IPAP of 21.9 and EPAP of 18.9.  Auto BiPAP settings: Max IPAP 25 and minimum EPAP 19 and maximum pressure support 6 and minimal pressure support 2.  Additionally, the patient uses O2 with her auto BiPAP.       Impression:   Obstructive sleep apnea with sleep-related hypoxemia adequately treated with auto titrating BiPAP and supplemental O2.  However, her compliance and her usage is suboptimal.  She has persistent complaints of hypersomnolence.      Plan:  Good sleep hygiene measures should be maintained.  Weight loss would be beneficial in this patient who is obese by BMI.  The patient is benefiting from the treatment being provided.     I reviewed all with the patient.  She reports her compliances down because she does not get her supplies and a timely fashion.  I described to her that she needs to use her auto BiPAP with supplemental oxygen every night and she needs to use it greater than 4 hours daily.    The patient will call for any problems and will follow up in 6 months.      Beau Niño MD  01/14/18  11:19 AM

## 2018-01-14 PROBLEM — G47.14 HYPERSOMNIA DUE TO MEDICAL CONDITION: Status: ACTIVE | Noted: 2018-01-14

## 2018-01-14 PROBLEM — IMO0001 CLASS 3 OBESITY DUE TO EXCESS CALORIES WITH SERIOUS COMORBIDITY AND BODY MASS INDEX (BMI) OF 50.0 TO 59.9 IN ADULT: Status: ACTIVE | Noted: 2018-01-14

## 2018-01-31 ENCOUNTER — TELEPHONE (OUTPATIENT)
Dept: FAMILY MEDICINE CLINIC | Facility: CLINIC | Age: 33
End: 2018-01-31

## 2018-01-31 DIAGNOSIS — K80.20 CALCULUS OF GALLBLADDER WITHOUT CHOLECYSTITIS WITHOUT OBSTRUCTION: Primary | ICD-10-CM

## 2018-01-31 NOTE — TELEPHONE ENCOUNTER
Patient called states she missed two appointments with the surgeon due to transportation issues she tried to reschedule told needs a new referral, states she is seeing the Therapist at [facility] and they will not prescribe Latuda or gabapentin, and she has been out for a month and they scheduled her with the psychiatrist in March to get her medications

## 2018-01-31 NOTE — TELEPHONE ENCOUNTER
PATIENT CAN SEE DR AYON FOR EVALUATION IF PSYCHIATRY CANNOT SEE FOR A MONTH. I AM NOT COMFORTABLE REFILLING MEDICATIONS. CAN SEE HIM FOR EVALUATION TO SEE IF HE FEELS THIS IS APPROPRIATE. PATIENT MUST GO TO APPT FOR SURGEON. I WILL PLACE REFERRAL

## 2018-03-06 ENCOUNTER — TELEPHONE (OUTPATIENT)
Dept: ORTHOPEDIC SURGERY | Facility: CLINIC | Age: 33
End: 2018-03-06

## 2018-03-06 NOTE — TELEPHONE ENCOUNTER
Please let her know that I have never seen her for her back and cannot authorize any brace for this. If she has problems with back she needs to see a back specialist to eval for treatment that may include bracing.. I cannot see her for this.

## 2018-03-23 NOTE — PATIENT INSTRUCTIONS
32 YEAR OLD FEMALE WHO PRESENTS TODAY IN FOLLOW UP OF DIABETES, PCOS. SHE IS TAKING MEDICATION AS DIRECTED. SHE DENIES AE, HOWEVER, SHE IS HAVING ABDOMINAL PAIN AND NAUSEA. THIS IS NOT CONSISTENT WITH DOSING. PATIENT REPORTS PREVIOUS ER VISIT THAT SHE HAD WITH ABDOMINAL PAIN AND WAS TOLD CHOLELITHIASIS AND THAT SHE NEEDED CHOLECYSTECTOMY. PATIENT DID NOT SEE SURGEON FOR THIS. SHE HAS PAIN BUT SEVERE EPISODES OF PAIN ARE ONLY AFTER EATING. PATIENT WITH POSITIVE RUQ TTP AND POSITIVE SCHILLING SIGN. I WILL TRY TO OBTAIN RECORDS FROM [facility] TO SEE IF SHE NEEDS ADDITIONAL IMAGING PRIOR TO GENERAL SURGERY REFERRAL. TO BE SEEN HERE OR ER ASAP IF WORSENING OR NEW SYMPTOMS. A1C IS DOWN FROM 6.6-6.1%, PATIENT TO CONTINUE MEDICATION.     PATIENT HAS BEEN MISSING APPTS. SHE HAS BEEN UNABLE TO SEE PSYCHOLOGIST IN [facility], SO SHE HAS APPT WITH [facility] IN [place]. SHE HAS NOT USED CPAP IN 2 MONTHS AND HASN'T SEEN DR LARSON IN 6 MONTHS. SHE WILL SCHEDULE FOLLOW UP THERE. PATIENT DID NOT ATTEND PHYSICAL THERAPY SESSIONS, BUT SHE WILL CALL TO RESTART. SHE MISSED APPT FOR MRI KNEES ORDERED BY ORTHOPEDIST BUT WILL RESCHEDULE. SHE WILL CALL ORTHO TO ENSURE NO PRE-CERT CHANGE IS NEEDED. PATIENT TO BE COMPLIANT WITH DIET AND EXERCISE AS WELL AS THERAPY AND MEDICAL APPTS. LABS TODAY- IF STABLE- FOLLOW UP IN 3 MONTHS WITH ME. FOLLOW UP SOONER IF NEEDED.   
97
73

## 2018-09-04 ENCOUNTER — DOCUMENTATION (OUTPATIENT)
Dept: PHYSICAL THERAPY | Facility: HOSPITAL | Age: 33
End: 2018-09-04

## 2018-09-04 DIAGNOSIS — M25.562 CHRONIC PAIN OF BOTH KNEES: Primary | ICD-10-CM

## 2018-09-04 DIAGNOSIS — E66.01 MORBID OBESITY, UNSPECIFIED OBESITY TYPE (HCC): ICD-10-CM

## 2018-09-04 DIAGNOSIS — M25.561 CHRONIC PAIN OF BOTH KNEES: Primary | ICD-10-CM

## 2018-09-04 DIAGNOSIS — M54.50 CHRONIC BILATERAL LOW BACK PAIN WITHOUT SCIATICA: ICD-10-CM

## 2018-09-04 DIAGNOSIS — G89.29 CHRONIC BILATERAL LOW BACK PAIN WITHOUT SCIATICA: ICD-10-CM

## 2018-09-04 DIAGNOSIS — G89.29 CHRONIC PAIN OF BOTH KNEES: Primary | ICD-10-CM

## 2018-09-04 NOTE — THERAPY DISCHARGE NOTE
Outpatient Physical Therapy Discharge Summary         Patient Name: Catherine Grady  : 1985  MRN: [id number]    Today's Date: 2018    Visit Dx:    ICD-10-CM ICD-9-CM   1. Chronic pain of both knees M25.561 719.46    M25.562 338.29    G89.29    2. Chronic bilateral low back pain without sciatica M54.5 724.2    G89.29 338.29   3. Morbid obesity, unspecified obesity type (CMS/[facility]) E66.01 278.01             PT OP Goals     Row Name 18 0900          PT Short Term Goals    STG Date to Achieve 17  -KH     STG 1 Pt to tolerate 45 minutes of aquatic ex with no ill effects on knee, back pain, or SOA  -KH     STG 1 Progress Met  -KH     STG 2 Pt to state improved ROM and less sub patellar pain L knee with worse pain < 5/10  -KH     STG 2 Progress Not Met  -KH     STG 3 Pt to state ongoing success with  and wt loss each week.   -KH     STG 3 Progress Not Met  -KH     STG 4 NAYELI to improve from 54 to < 45%  -KH     STG 4 Progress Not Met  -KH     STG 5 KOS to improve from 51 to < 45%  -KH     STG 5 Progress Not Met  -KH        Long Term Goals    LTG Date to Achieve 10/18/17  -KH     LTG 1 Pt to be indep in water walking and other core and LE ex   -KH     LTG 1 Progress Not Met  -KH     LTG 2 Pt to state worse back and knee pain to be < 4/10  -KH     LTG 2 Progress Not Met  -KH     LTG 3 Pt avoid need for ortho and injections/surgery for L knee  -KH     LTG 3 Progress Not Met  -KH     LTG 4 Pt to state less need for pain management meds or injections  -KH     LTG 4 Progress Not Met  -KH     LTG 5 ANYELI to be < 35% at dc  -KH     LTG 5 Progress Not Met  -KH     LTG 6 KOS to be < 35% at dc  -KH     LTG 6 Progress Not Met  -KH       User Key  (r) = Recorded By, (t) = Taken By, (c) = Cosigned By    Initials Name Provider Type    Prabha Hernández, PT Physical Therapist          OP PT Discharge Summary  Date of Discharge: 18  Reason for Discharge: Non-compliant (Patient only completed 3 sessions,  frequent cancels/no shows)  Outcomes Achieved: Unable to make functional progress toward goals at this time  Discharge Destination: Home with home program      Time Calculation:        Therapy Suggested Charges     Code   Minutes Charges    None                       Prabha Dale, PT  9/4/2018

## 2018-09-27 ENCOUNTER — APPOINTMENT (OUTPATIENT)
Dept: SLEEP MEDICINE | Facility: HOSPITAL | Age: 33
End: 2018-09-27
Attending: INTERNAL MEDICINE

## 2018-11-21 ENCOUNTER — OFFICE VISIT (OUTPATIENT)
Dept: ORTHOPEDIC SURGERY | Facility: CLINIC | Age: 33
End: 2018-11-21

## 2018-11-21 VITALS — BODY MASS INDEX: 57.52 KG/M2 | TEMPERATURE: 98.2 F | HEIGHT: 60 IN | WEIGHT: 293 LBS

## 2018-11-21 DIAGNOSIS — M23.207 OLD TEAR OF MENISCUS OF LEFT KNEE, UNSPECIFIED MENISCUS, UNSPECIFIED TEAR TYPE: Primary | ICD-10-CM

## 2018-11-21 DIAGNOSIS — M17.0 ARTHRITIS OF BOTH KNEES: ICD-10-CM

## 2018-11-21 DIAGNOSIS — M23.206 OLD TEAR OF MENISCUS OF RIGHT KNEE, UNSPECIFIED MENISCUS, UNSPECIFIED TEAR TYPE: ICD-10-CM

## 2018-11-21 PROCEDURE — 99213 OFFICE O/P EST LOW 20 MIN: CPT | Performed by: ORTHOPAEDIC SURGERY

## 2018-11-21 PROCEDURE — 73562 X-RAY EXAM OF KNEE 3: CPT | Performed by: ORTHOPAEDIC SURGERY

## 2018-11-21 RX ORDER — RANITIDINE 150 MG/1
150 CAPSULE ORAL
COMMUNITY
Start: 2018-11-06

## 2018-11-21 RX ORDER — LEVONORGESTREL AND ETHINYL ESTRADIOL 0.1-0.02MG
1 KIT ORAL
COMMUNITY
Start: 2018-05-25 | End: 2019-05-25

## 2018-11-21 NOTE — PROGRESS NOTES
Knee Exam      Patient: Catherine Grady    YOB: 1985 33 y.o. female    Chief Complaints: knee hurts    History of Present Illness:Patient was seen on 9/20/17 with a one-year history of severe constant aching pain with feelings of catching and popping in the left knee more so than the right.  At that time we ordered MRIs of both knees however she said her right knee felt much better  she did not have the MRI on it.   last seen on 11/29/17 still had similar complaints to left knee..   he had not yet done any physical therapy or aqua therapy at [facility] and she was using her walker around the house and uses a motorized wheelchair when she is out of the house.    At that time we reviewed MRI of her left knee from 11/27/17 which had shown moderate medial and patellofemoral arthritis with some blunting of the free edge of the medial meniscus with partial extrusion and increased signal within the free edge thought to be degenerative tear but no displacement there was a joint effusion and subchondral marrow edema within the medial aspect of the tibial plateau.    Reviewed with her that time that I did not feel that arthroscopic surgery be of any benefit to her.  The left knee was injected at that time and instructions were given for physical therapy and for use of meloxicam.  She was referred for possible bariatric surgery.  She was also fitted with a medial heel wedge.    She states the kitchen helped some for a month or so with return of moderate intermittent aching pain mainly over the medial anterior aspect of the left knee especially when she first gets out of bed in the morning.  She is still using a exercise bike and treadmill and has lost quite a bit of weight but never saw anyone  about bariatric surgery and wants to hold off on that until she sees if she can continue losing weight on her own..      She does report worsening symptoms with last 3 or 4 weeks with moderate constant stabbing pain in the  "medial aspect of the left knee with feelings of locking and catching.  The pain she has been she gets up in the morning is quite severe and does ease up somewhat she gets going but then recurs after activity.  He does use a power chair when she is currently mobilizing for long periods  HPI    ROS: knee pain  Past Medical History:   Diagnosis Date   • Anxiety    • Arthritis    • Asthma    • Bipolar 1 disorder (CMS/HCC)    • Polycystic ovarian syndrome    • Sleep apnea        Physical Exam:   Vitals:    11/21/18 1111   Temp: 98.2 °F (36.8 °C)   Weight: (!) 152 kg (334 lb)   Height: 152.4 cm (60\")     Well developed with normal mood.  Left knee shows mild effusion no warmth erythema.  0-115° range of motion.  Moderate discomfort over the medial aspect of the patellofemoral joint and significant pain over the medial tibial plateau and joint line exacerbated with Светлана's.      Radiology: 3 views left knee ordered to evaluate pain reviewed and compared with x-rays from last year.  There is moderate arthritic change of the medial and patellofemoral joints with some valgus alignment to the knee but no significant change compared to previous views      Assessment/Plan: Exacerbation of chronic left knee pain with arthritis and previous fraying of the meniscus but no clear tear.    I were given her symptoms that she may have a meniscal tear that's now displaced or medial tibial plateau stress fracture.    She was fitted with a cane today will limit weightbearing activities and we'll get an MRI of her left knee to evaluate for displaced meniscal tear or stress fracture.    She states she doesn't want to have another injection and we will see what her MRI shows.    We had a pleasant visit and she understands to call to schedule follow-up appointment after MRI has been scheduled in ordered to review results in the office  re bot  "

## 2018-11-27 ENCOUNTER — TELEPHONE (OUTPATIENT)
Dept: ORTHOPEDIC SURGERY | Facility: CLINIC | Age: 33
End: 2018-11-27

## 2019-07-25 ENCOUNTER — APPOINTMENT (OUTPATIENT)
Dept: SLEEP MEDICINE | Facility: HOSPITAL | Age: 34
End: 2019-07-25

## 2019-12-10 ENCOUNTER — APPOINTMENT (OUTPATIENT)
Dept: SLEEP MEDICINE | Facility: HOSPITAL | Age: 34
End: 2019-12-10

## 2020-01-16 ENCOUNTER — OFFICE VISIT (OUTPATIENT)
Dept: ORTHOPEDIC SURGERY | Facility: CLINIC | Age: 35
End: 2020-01-16

## 2020-01-16 VITALS — BODY MASS INDEX: 57.52 KG/M2 | HEIGHT: 60 IN | TEMPERATURE: 97.3 F | WEIGHT: 293 LBS

## 2020-01-16 DIAGNOSIS — S83.242D TEAR OF MEDIAL MENISCUS OF LEFT KNEE, CURRENT, UNSPECIFIED TEAR TYPE, SUBSEQUENT ENCOUNTER: Primary | ICD-10-CM

## 2020-01-16 DIAGNOSIS — M25.562 LEFT KNEE PAIN, UNSPECIFIED CHRONICITY: ICD-10-CM

## 2020-01-16 DIAGNOSIS — M17.0 ARTHRITIS OF BOTH KNEES: ICD-10-CM

## 2020-01-16 PROCEDURE — 99214 OFFICE O/P EST MOD 30 MIN: CPT | Performed by: ORTHOPAEDIC SURGERY

## 2020-01-16 PROCEDURE — 73562 X-RAY EXAM OF KNEE 3: CPT | Performed by: ORTHOPAEDIC SURGERY

## 2020-01-16 NOTE — PROGRESS NOTES
"Knee Exam      Patient: Catherine Grady    YOB: 1985 35 y.o. female    Chief Complaints: knees hurt    History of Present Illness: Patient was last seen in November 2018 for worsening pain in her left knee with feelings of locking and catching.  She had previous MRI of the left knee in November 2017 which had shown medial patellofemoral arthritis with some blunting of the free edge of the medial meniscus with partial extrusion and increased signal in the free edge thought to be degenerative tear but no displacement.  There was some subchondral marrow edema the medial aspect the tibial plateau.    Patient was seen on 9/20/17 with a one-year history of severe constant aching pain with feelings of catching and popping in the left knee more so than the right.  At that time we ordered MRIs of both knees however she said her right knee felt much better  she did not have the MRI on it.   last seen on 11/29/17 still had similar complaints to left knee..   he had not yet done any physical therapy or aqua therapy at [facility] and she was using her walker around the house and uses a motorized wheelchair when she is out of the house.    At our last visit in November 2018 we sent her for MRI of her left knee but she never got.  She apologized for canceling and missing multiple appointments says she had many other things going on and symptoms in her left knee have worsened.  She also reports mild intermittent aching pain over the medial aspect of the right knee but no locking or catching.      HPI    ROS: knee pain no fevers chills chest pain or shortness of breath  Past Medical History:   Diagnosis Date   • Anxiety    • Arthritis    • Asthma    • Bipolar 1 disorder (CMS/[facility])    • Polycystic ovarian syndrome    • Sleep apnea        Physical Exam:   Vitals:    01/16/20 1034   Temp: 97.3 °F (36.3 °C)   Weight: (!) 158 kg (347 lb 9.6 oz)   Height: 152.4 cm (60\")     Well developed with normal mood.  Examination the left " "knee shows mild effusion no warmth erythema there is moderate discomfort over the medial joint line exacerbated with Светлана's but no focal tenderness over the medial lateral femoral condyles.  There is mild discomfort over the medial tibial plateau.  0 to 115 degrees range of motion.    Right knee shows some mild discomfort with patellofemoral compression over the medial joint line but no exacerbation of pain with Светлана's and no localizing symptoms of stress fracture at the femoral condyles or tibial plateaus today.      Radiology: 3 views of the left knee and second AP view of the right knee was somehow done as well to evaluate pain reviewed and compared with x-rays of the left knee from 11/21/2018.  There is been some interval change with narrowing of the medial joint space and arthritic change with widening laterally as well as patellofemoral arthritic changes as well.      Assessment/Plan: Bilateral knee pain left greater than right.  With probable exacerbation of arthritic symptoms and meniscal tear on the left.    She no longer been using her power chair as her ex \"sold it\" along with her other durable medical equipment.    She has been continue to work as a  as much as she can.    We need to get further work-up on her left knee as her symptoms have persisted and worsened with some mechanical symptoms suggestive of now possible displaced meniscal tear.    Recommend she at least use a cane to offload this as much as possible when to get an MRI of her left knee for further evaluation.    We will can hold off on further imaging of her right knee as it is much less symptomatic and may be pain from favoring the left.    She understands to call to schedule follow-up appointment after MRI has been scheduled in order review results in the office  "

## 2020-02-26 ENCOUNTER — APPOINTMENT (OUTPATIENT)
Dept: SLEEP MEDICINE | Facility: HOSPITAL | Age: 35
End: 2020-02-26

## 2020-03-27 ENCOUNTER — TELEPHONE (OUTPATIENT)
Dept: ORTHOPEDIC SURGERY | Facility: CLINIC | Age: 35
End: 2020-03-27

## 2020-03-27 NOTE — TELEPHONE ENCOUNTER
Have been attempted to contact the patient regarding her missed appointment and MRI.  Line was busy and will attempt again later

## 2020-04-01 ENCOUNTER — TELEPHONE (OUTPATIENT)
Dept: ORTHOPEDIC SURGERY | Facility: CLINIC | Age: 35
End: 2020-04-01

## 2022-06-19 ENCOUNTER — APPOINTMENT (OUTPATIENT)
Dept: GENERAL RADIOLOGY | Facility: HOSPITAL | Age: 37
End: 2022-06-19

## 2022-06-19 ENCOUNTER — HOSPITAL ENCOUNTER (EMERGENCY)
Facility: HOSPITAL | Age: 37
Discharge: HOME | End: 2022-06-19
Attending: EMERGENCY MEDICINE | Admitting: EMERGENCY MEDICINE

## 2022-06-19 VITALS
HEART RATE: 80 BPM | SYSTOLIC BLOOD PRESSURE: 127 MMHG | OXYGEN SATURATION: 97 % | BODY MASS INDEX: 57.52 KG/M2 | TEMPERATURE: 98 F | HEIGHT: 60 IN | RESPIRATION RATE: 20 BRPM | WEIGHT: 293 LBS | DIASTOLIC BLOOD PRESSURE: 71 MMHG
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
## 2022-06-19 DIAGNOSIS — S33.5XXA LUMBAR BACK SPRAIN, INITIAL ENCOUNTER: ICD-10-CM

## 2022-06-19 PROCEDURE — 72100 X-RAY EXAM L-S SPINE 2/3 VWS: CPT

## 2022-06-19 PROCEDURE — 99283 EMERGENCY DEPT VISIT LOW MDM: CPT

## 2022-06-19 RX ORDER — HYDROCODONE BITARTRATE AND ACETAMINOPHEN 7.5; 325 MG/1; MG/1
1 TABLET ORAL ONCE
Status: COMPLETED | OUTPATIENT
Start: 2022-06-19 | End: 2022-06-19

## 2022-06-19 RX ADMIN — HYDROCODONE BITARTRATE AND ACETAMINOPHEN 1 TABLET: 7.5; 325 TABLET ORAL at 20:12

## 2022-06-20 NOTE — ED PROVIDER NOTES
Time: 8:00 PM EDT  Arrived by: private car  Chief Complaint: fall  History provided by: Patient, EMS  History is limited by: N/A     History of Present Illness:  Patient is a 37 y.o. year old female  who presents to the emergency department via EMS with fall with onset today. Pt reports that she slipped and fell while at work possibly due to new floors. Pt fell forwards and landed on her knees. Pt originally states that she didn't have any pain, but later reports some lower back soreness. She is able to walk without assistance. Pt denies any fever, cough, N/V/D, abdominal pain or dysuria. She denies any knee pain currently. Denies any LOC. She denies any numbness or tingling.    HPI    Similar Symptoms Previously: no  Recently seen: no      Patient Care Team  Primary Care Provider: Ale Pace APRN    Past Medical History:     Allergies   Allergen Reactions   • Aspirin Hives     Past Medical History:   Diagnosis Date   • Anxiety    • Arthritis    • Asthma    • Bipolar 1 disorder (HCC)    • Polycystic ovarian syndrome    • Sleep apnea      Past Surgical History:   Procedure Laterality Date   • DILATATION AND CURETTAGE N/A 2014   • TONSILLECTOMY Bilateral    • TYMPANOSTOMY TUBE PLACEMENT       Family History   Problem Relation Age of Onset   • Depression Mother        Home Medications:  Prior to Admission medications    Medication Sig Start Date End Date Taking? Authorizing Provider   albuterol (PROAIR HFA) 108 (90 Base) MCG/ACT inhaler Inhale 2 puffs Every 6 (Six) Hours As Needed for Wheezing. 12/13/17   Vandana Parekh PA   albuterol sulfate  (90 Base) MCG/ACT inhaler Inhale 2 puffs. 5/14/22   Emergency, Nurse Toney, RN   atorvastatin (LIPITOR) 10 MG tablet Take 10 mg by mouth Daily. 7/15/21 7/15/22  Emergency, Nurse Toney, RN   azithromycin (Zithromax Z-Hung) 250 MG tablet Take 2 tablets by mouth on day 1, then 1 tablet daily on days 2-5 5/21/22   Liliana Bradley APRN   Elastic Bandages & Supports  (CARPAL TUNNEL WRIST DELUXE) [facility] [place] [place] 2 each Daily. 17   Vandana Parekh PA   furosemide (LASIX) 20 MG tablet Take 20 mg by mouth. 7/15/21 7/15/22  Emergency, Nurse Toney RN   gabapentin (NEURONTIN) 300 MG capsule TAKE ONE CAPSULE BY MOUTH TWO TIMES A DAY AND 2 CAPSULES AT BEDTIME. 17   Jonathan Disla MD   HYDROcodone-acetaminophen (NORCO) 7.5-325 MG per tablet Take 1 tablet by mouth Every 6 (Six) Hours As Needed for Moderate Pain .    Jonathan Disla MD   HYDROcodone-acetaminophen (NORCO) 7.5-325 MG per tablet Take 1 tablet by mouth Every 12 (Twelve) Hours As Needed. 22   Emergency, Nurse Toney RN   Lurasidone HCl (LATUDA) 20 MG tablet tablet Take  by mouth.    Jonathan Disla MD   metFORMIN (GLUCOPHAGE) 500 MG tablet TAKE 1 TABLET BY MOUTH 2 (TWO) TIMES A DAY WITH MEALS. 17   Vandana Parekh PA   montelukast (SINGULAIR) 10 MG tablet Take 10 mg by mouth Daily. 7/15/21 7/15/22  Emergency, Nurse Toney RN   ranitidine (ZANTAC) 150 MG capsule Take 150 mg by mouth. 18   Jonathan Disla MD   spironolactone (ALDACTONE) 25 MG tablet Take 25 mg by mouth Daily. 7/15/21 7/15/22  Emergency, Nurse Toney RN   vitamin B-12 (CYANOCOBALAMIN) 500 MCG tablet TAKE 1 TABLET 2 X WEEKLY 18   Vandana Parekh PA        Social History:   Social History     Tobacco Use   • Smoking status: Current Every Day Smoker     Packs/day: 0.25     Years: 20.00     Pack years: 5.00     Types: Cigarettes     Last attempt to quit: 2017     Years since quittin.8   • Smokeless tobacco: Never Used   Substance Use Topics   • Alcohol use: Yes     Comment: social   • Drug use: No     Recent travel: no    Review of Systems:  Review of Systems   Constitutional: Negative for chills and fever.   HENT: Negative for congestion, rhinorrhea and sore throat.    Eyes: Negative for pain and visual disturbance.   Respiratory: Negative for apnea, cough, chest tightness and shortness of breath.    Cardiovascular:  "Negative for chest pain and palpitations.   Gastrointestinal: Negative for abdominal pain, diarrhea, nausea and vomiting.   Genitourinary: Negative for difficulty urinating and dysuria.   Musculoskeletal: Positive for back pain. Negative for joint swelling and myalgias.   Skin: Negative for color change.   Neurological: Negative for seizures and headaches.   Psychiatric/Behavioral: Negative.    All other systems reviewed and are negative.       Physical Exam:  /71   Pulse 80   Temp 98 °F (36.7 °C) (Oral)   Resp 20   Ht 152.4 cm (60\")   Wt (!) 162 kg (357 lb 5.9 oz)   LMP  (LMP Unknown)   SpO2 97%   BMI 69.79 kg/m²     Physical Exam  Vitals and nursing note reviewed.   Constitutional:       Appearance: Normal appearance.   HENT:      Head: Normocephalic and atraumatic.      Nose: Nose normal.      Mouth/Throat:      Mouth: Mucous membranes are moist.   Eyes:      Extraocular Movements: Extraocular movements intact.      Pupils: Pupils are equal, round, and reactive to light.   Cardiovascular:      Rate and Rhythm: Normal rate and regular rhythm.      Heart sounds: Normal heart sounds.   Pulmonary:      Effort: Pulmonary effort is normal.      Breath sounds: Normal breath sounds.   Abdominal:      General: Bowel sounds are normal.      Palpations: Abdomen is soft.      Tenderness: There is no abdominal tenderness.   Musculoskeletal:         General: No swelling. Normal range of motion.      Cervical back: Normal range of motion and neck supple.      Comments: diffuse lumbar spine paraspinal tenderness   Skin:     General: Skin is warm and dry.      Coloration: Skin is not jaundiced.   Neurological:      General: No focal deficit present.      Mental Status: She is alert and oriented to person, place, and time. Mental status is at baseline.   Psychiatric:         Mood and Affect: Mood normal.         Behavior: Behavior normal.         Judgment: Judgment normal.                Medications in the Emergency " Department:  Medications   HYDROcodone-acetaminophen (NORCO) 7.5-325 MG per tablet 1 tablet (1 tablet Oral Given 6/19/22 2012)        Labs  Lab Results (last 24 hours)     ** No results found for the last 24 hours. **           Imaging:  XR Spine Lumbar 2 or 3 View    Result Date: 6/19/2022  PROCEDURE: XR SPINE LUMBAR 2 OR 3 VW  COMPARISON: None.  INDICATIONS: FELL AT WORK & COMPLAINS OF LOWER BACK PAIN.  FINDINGS:   Three views were obtained.  Degenerative changes are seen throughout the imaged spine.  There may be diffuse idiopathic skeletal hyperostosis (DISH).  Degenerative changes involve the bilateral sacroiliac joints.  No acute fracture.  No aggressive osseous lesion is suggested.  There are 6 lumbar-like vertebrae with suspected transitional changes at the thoracolumbar and lumbosacral junctions.  If symptoms or clinical concerns persist, consider imaging follow-up.         No acute fracture or acute malalignment is appreciated.      COMMENT:  Part of this note is an electronic transcription of spoken language to printed text. The electronic translation/transcription may permit erroneous, or at times, nonsensical (or even sensical) words or phrases to be inadvertently transcribed or omitted; this  has reviewed the note for such errors (as well as additional errors); however, some may still exist.  JUANA JULIEN JR, MD       Electronically Signed and Approved By: JUANA JULIEN JR, MD on 6/19/2022 at 21:16                Procedures:  Procedures    Progress                            Medical Decision Making:  MDM  Number of Diagnoses or Management Options  Fall, initial encounter: new and does not require workup  Lumbar back sprain, initial encounter: new and does not require workup     Amount and/or Complexity of Data Reviewed  Independent visualization of images, tracings, or specimens: yes    Risk of Complications, Morbidity, and/or Mortality  Presenting problems: moderate  Management options:  low    Patient Progress  Patient progress: stable       Final diagnoses:   Fall, initial encounter   Lumbar back sprain, initial encounter        Disposition:  ED Disposition     ED Disposition   Discharge    Condition   Stable    Comment   --             This medical record created using voice recognition software.           Raleigh Pruett  06/19/22 2005       Raleigh Pruett  06/19/22 2013       Lavell Ochoa MD  06/19/22 [id number]

## 2023-01-30 ENCOUNTER — TRANSCRIBE ORDERS (OUTPATIENT)
Dept: ADMINISTRATIVE | Facility: HOSPITAL | Age: 38
End: 2023-01-30
Payer: MEDICARE

## 2023-01-30 DIAGNOSIS — H47.10 UNSPECIFIED PAPILLEDEMA: Primary | ICD-10-CM

## 2023-02-02 PROBLEM — J10.1 INFLUENZA A: Status: ACTIVE | Noted: 2023-02-02

## 2023-02-08 ENCOUNTER — HOSPITAL ENCOUNTER (OUTPATIENT)
Dept: INTERVENTIONAL RADIOLOGY/VASCULAR | Facility: HOSPITAL | Age: 38
Discharge: HOME | End: 2023-02-08
Payer: MEDICARE

## 2023-02-08 ENCOUNTER — LAB (OUTPATIENT)
Dept: LAB | Facility: HOSPITAL | Age: 38
End: 2023-02-08
Payer: MEDICARE

## 2023-02-08 VITALS
OXYGEN SATURATION: 92 % | RESPIRATION RATE: 18 BRPM | SYSTOLIC BLOOD PRESSURE: 149 MMHG | DIASTOLIC BLOOD PRESSURE: 98 MMHG | HEART RATE: 82 BPM

## 2023-02-08 DIAGNOSIS — H47.10 UNSPECIFIED PAPILLEDEMA: ICD-10-CM

## 2023-02-08 LAB
APPEARANCE CSF: CLEAR
APTT PPP: 26.4 SECONDS (ref 24.2–34.2)
BASOPHILS # BLD AUTO: 0.05 10*3/MM3 (ref 0–0.2)
BASOPHILS NFR BLD AUTO: 0.5 % (ref 0–1.5)
COLOR CSF: COLORLESS
DEPRECATED RDW RBC AUTO: 44.6 FL (ref 37–54)
EOSINOPHIL # BLD AUTO: 0.19 10*3/MM3 (ref 0–0.4)
EOSINOPHIL NFR BLD AUTO: 2.1 % (ref 0.3–6.2)
ERYTHROCYTE [DISTWIDTH] IN BLOOD BY AUTOMATED COUNT: 13.2 % (ref 12.3–15.4)
GLUCOSE CSF-MCNC: 61 MG/DL (ref 40–70)
HCT VFR BLD AUTO: 43.6 % (ref 34–46.6)
HGB BLD-MCNC: 14.1 G/DL (ref 12–15.9)
IMM GRANULOCYTES # BLD AUTO: 0.02 10*3/MM3 (ref 0–0.05)
IMM GRANULOCYTES NFR BLD AUTO: 0.2 % (ref 0–0.5)
INR PPP: 1 (ref 0.86–1.15)
LYMPHOCYTES # BLD AUTO: 2.14 10*3/MM3 (ref 0.7–3.1)
LYMPHOCYTES NFR BLD AUTO: 23.2 % (ref 19.6–45.3)
MCH RBC QN AUTO: 30.3 PG (ref 26.6–33)
MCHC RBC AUTO-ENTMCNC: 32.3 G/DL (ref 31.5–35.7)
MCV RBC AUTO: 93.6 FL (ref 79–97)
MONOCYTES # BLD AUTO: 0.54 10*3/MM3 (ref 0.1–0.9)
MONOCYTES NFR BLD AUTO: 5.9 % (ref 5–12)
NEUTROPHILS NFR BLD AUTO: 6.28 10*3/MM3 (ref 1.7–7)
NEUTROPHILS NFR BLD AUTO: 68.1 % (ref 42.7–76)
NRBC BLD AUTO-RTO: 0 /100 WBC (ref 0–0.2)
NUC CELL # CSF MANUAL: 0 /MM3 (ref 0–5)
PLATELET # BLD AUTO: 213 10*3/MM3 (ref 140–450)
PMV BLD AUTO: 10.5 FL (ref 6–12)
PROT CSF-MCNC: 26.9 MG/DL (ref 15–45)
PROTHROMBIN TIME: 13.3 SECONDS (ref 11.8–14.9)
RBC # BLD AUTO: 4.66 10*6/MM3 (ref 3.77–5.28)
RBC # CSF MANUAL: 100 /MM3
TUBE # CSF: 3
WBC NRBC COR # BLD: 9.22 10*3/MM3 (ref 3.4–10.8)
XANTHOCHROMIA FLD QL: NORMAL

## 2023-02-08 PROCEDURE — 84157 ASSAY OF PROTEIN OTHER: CPT

## 2023-02-08 PROCEDURE — 85025 COMPLETE CBC W/AUTO DIFF WBC: CPT

## 2023-02-08 PROCEDURE — 85610 PROTHROMBIN TIME: CPT

## 2023-02-08 PROCEDURE — 82945 GLUCOSE OTHER FLUID: CPT

## 2023-02-08 PROCEDURE — 89050 BODY FLUID CELL COUNT: CPT

## 2023-02-08 PROCEDURE — 85730 THROMBOPLASTIN TIME PARTIAL: CPT

## 2023-02-08 RX ORDER — LIDOCAINE HYDROCHLORIDE 20 MG/ML
20 INJECTION, SOLUTION INFILTRATION; PERINEURAL ONCE
Status: COMPLETED | OUTPATIENT
Start: 2023-02-08 | End: 2023-02-08

## 2023-02-08 RX ADMIN — LIDOCAINE HYDROCHLORIDE 10 ML: 20 INJECTION, SOLUTION INFILTRATION; PERINEURAL at 11:00

## 2023-02-08 RX ADMIN — SODIUM BICARBONATE 1 ML: 84 INJECTION, SOLUTION INTRAVENOUS at 11:00

## 2023-02-08 NOTE — NURSING NOTE
"Discharge instructions given/explained, signs/symptoms to notify MD/return to ER discussed, questions answered.  Band aid to low back without changes to note, instructed may remove tomorrow and shower.  States headache mostly gone now, back pain \"chronic and I always have it.\"  Verbalizes understanding of all discharge instructions. Leaving via wheelchair accompanied by RN to main entrance where friend Sybil waiting with car.  "

## 2023-02-08 NOTE — NURSING NOTE
"Arrived to rad holding via stretcher post LP per specials tech.  Vitals stable.  States has chronic back pain, rates 9, hasn't changed from when she arrived this morning. States has \"a little bit of headache, not bad.\"  Band aid to low back clean, dry, intact, no visible drainage.  Post LP instructions reviewed.  Given Yelena Cola, peanut butter crackers and Karthaus lemon lime soda to drink.  Pt's friend Sybil, updated and discharge time given per pt request.  Call light in reach, personal belongings with pt.  "

## 2023-05-23 ENCOUNTER — TRANSCRIBE ORDERS (OUTPATIENT)
Dept: DIABETES SERVICES | Facility: HOSPITAL | Age: 38
End: 2023-05-23
Payer: MEDICARE

## 2023-05-23 DIAGNOSIS — E11.65 TYPE 2 DIABETES MELLITUS WITH HYPERGLYCEMIA, UNSPECIFIED WHETHER LONG TERM INSULIN USE: Primary | ICD-10-CM

## 2023-06-05 ENCOUNTER — EDUCATION (OUTPATIENT)
Dept: DIABETES SERVICES | Facility: HOSPITAL | Age: 38
End: 2023-06-05
Payer: MEDICARE

## 2023-06-05 DIAGNOSIS — E11.8 TYPE 2 DIABETES MELLITUS WITH UNSPECIFIED COMPLICATIONS: Primary | ICD-10-CM

## 2023-06-05 PROCEDURE — G0108 DIAB MANAGE TRN  PER INDIV: HCPCS

## 2023-06-05 NOTE — LETTER
Initial Visit and Education Plan:  Catherine Grady 38 y.o. and friend presented to [facility] for initial self diabetes management education and training.  Patient states the reason for their visit is to learn more about what she could eat and how to eat.  Catherine Grady is referred by Ale Pace APRN.     Ht: 5 foot    Wt: 356 pounds    Allergies   Allergen Reactions   • Aspirin Hives             We discussed hemoglobin A1c.  Written information was given to patient    Past Medical History:   Diagnosis Date   • Anxiety    • Arthritis    • Asthma    • Bipolar 1 disorder    • Polycystic ovarian syndrome    • Sleep apnea         Past Surgical History:   • DILATATION AND CURETTAGE   • TONSILLECTOMY   • TYMPANOSTOMY TUBE PLACEMENT        Social History     Tobacco Use   • Smoking status: Former     Packs/day: 0.25     Years: 20.00     Pack years: 5.00     Types: Cigarettes     Quit date: 2017     Years since quittin.8   • Smokeless tobacco: Never   Vaping Use   • Vaping Use: Never used   Substance Use Topics   • Alcohol use: Yes     Comment: social   • Drug use: No        Family History   Problem Relation Age of Onset   • Depression Mother         Education Plan as follows:      Blood Glucose Monitoring Instructions and Plan:   We reviewed blood glucose testing and ADA recommended blood glucose level for fasting, pre and post meals. Patient demonstrates understanding and importance of testing blood glucose 1-2 times a day; Patient will log BG results. Patient was given written material including blood glucose log.     Initial Nutrition Instructions and Plan:   Patient was instructed and demonstrated reading a food label. Serving size and carbohydrate amounts were emphasized.   45 carbs per meal 3 meals a day  15-20 carbs per snacks 3   snacks per day.   Patient was given written materials..   My [employer]itness Pal Jo Ann explained and demonstrated to patient.     Medication Instructions and Plan:      Current Outpatient Medications:   •  Accu-Chek Guide test strip, 1 each by Other route 2 (Two) Times a Day., Disp: , Rfl:   •  Accu-Chek Softclix Lancets lancets, 1 each by Other route 2 (Two) Times a Day., Disp: , Rfl:   •  albuterol (PROAIR HFA) 108 (90 Base) MCG/ACT inhaler, Inhale 2 puffs Every 6 (Six) Hours As Needed for Wheezing., Disp: 1 inhaler, Rfl: 0  •  albuterol sulfate  (90 Base) MCG/ACT inhaler, Inhale 2 puffs., Disp: , Rfl:   •  atorvastatin (LIPITOR) 10 MG tablet, Take 1 tablet by mouth every night at bedtime., Disp: , Rfl:   •  azithromycin (Zithromax Z-Hung) 250 MG tablet, Take 2 tablets by mouth on day 1, then 1 tablet daily on days 2-5, Disp: 6 tablet, Rfl: 0  •  B-D ULTRAFINE III SHORT PEN 31G X 8 MM misc, USE AS DIRECTED WEEKLY, Disp: , Rfl:   •  Elastic Bandages & Supports (CARPAL TUNNEL WRIST DELUXE) misc, 2 each Daily., Disp: 2 each, Rfl: 0  •  furosemide (LASIX) 20 MG tablet, Take 1 tablet by mouth., Disp: , Rfl:   •  gabapentin (NEURONTIN) 300 MG capsule, TAKE ONE CAPSULE BY MOUTH TWO TIMES A DAY AND 2 CAPSULES AT BEDTIME., Disp: , Rfl: 0  •  gabapentin (NEURONTIN) 400 MG capsule, , Disp: , Rfl:   •  HYDROcodone-acetaminophen (NORCO) 7.5-325 MG per tablet, Take 1 tablet by mouth Every 6 (Six) Hours As Needed for Moderate Pain., Disp: , Rfl:   •  HYDROcodone-acetaminophen (NORCO) 7.5-325 MG per tablet, Take 1 tablet by mouth Every 12 (Twelve) Hours As Needed., Disp: , Rfl:   •  hydrOXYzine (ATARAX) 25 MG tablet, , Disp: , Rfl:   •  ibuprofen (ADVIL,MOTRIN) 800 MG tablet, , Disp: , Rfl:   •  Januvia 25 MG tablet, Take 1 tablet by mouth Daily., Disp: , Rfl:   •  Latuda 40 MG tablet tablet, Take 1 tablet by mouth Daily., Disp: , Rfl:   •  Lurasidone HCl (LATUDA) 20 MG tablet tablet, Take  by mouth., Disp: , Rfl:   •  montelukast (SINGULAIR) 10 MG tablet, Take 1 tablet by mouth Daily., Disp: , Rfl:   •  Ozempic, 1 MG/DOSE, 4 MG/3ML solution pen-injector, INJECT 1 MG SUBCUTANEOUSLY  ONCE WEEKLY, Disp: , Rfl:   •  ranitidine (ZANTAC) 150 MG capsule, Take 1 capsule by mouth., Disp: , Rfl:   •  spironolactone (ALDACTONE) 25 MG tablet, Take 1 tablet by mouth Daily., Disp: , Rfl:   •  Sprintec 28 0.25-35 MG-MCG per tablet, Take 1 tablet by mouth Daily., Disp: , Rfl:   •  traZODone (DESYREL) 100 MG tablet, Take 1 tablet by mouth every night at bedtime., Disp: , Rfl:   •  vitamin B-12 (CYANOCOBALAMIN) 500 MCG tablet, TAKE 1 TABLET 2 X WEEKLY, Disp: 12 tablet, Rfl: 3   Medication list was reviewed with patient. Patient denies questions or concerns regarding current medication therapy. Patient was instructed to continue medications as prescribed.     Exercise:   Patient has been instructed to walk for 10 minutes after each meal initially and build strength and endurance to achieve 30 minutes of sustained exercise per day; recommended patient seek advice from Primary Care Provider prior to beginning any exercise program if other health concerns exist.     Problem solving and reducing risks:   I explained the importance of keeping provider appointments, taking medications as prescribed, having laboratory work performed as ordered by provider and seeking care as soon as possible when complications occur.     Patient Selected Behavioral Goal :  #1 -To continue to decrease portion sizes      Patient Selected Ongoing Support Plan:  Friend Support          Follow up Instructions and Plan:Patient was encouraged to contact DSME staff with questions and or concerns.  DSME staff contact information was given to patient.  Patient will be contacted when group classes resume. DSME staff will contact patient at 3 months and 6 months to evaluate patient's further needs regarding diabetes.      Other: Patient also states she has PCOS.    This note will be forwarded to PCP.  ANTONELLA ABARCA-AW, MLDE, CDCES    Start Time: 12: 45  End Time: 13: 45      06/05/2023 June 5, 2023     Ale Pace, APRN  [address]  [place] [address]    Patient: Catherine Grady   YOB: 1985   Date of Visit: 6/5/2023       Dear Dr. Pace, APRN:    Thank you for referring Catherine Grady to me for evaluation. Below are the relevant portions of my assessment and plan of care.    If you have questions, please do not hesitate to call me. I look forward to following Catherine along with you.         Sincerely,        Klarissa Alarcon RN        CC: No Recipients

## 2023-06-05 NOTE — PROGRESS NOTES
Initial Visit and Education Plan:  Catherine Grady 38 y.o. and friend presented to [facility] for initial self diabetes management education and training.  Patient states the reason for their visit is to learn more about what she could eat and how to eat.  Catherine Grady is referred by Ale Pace APRN.     Ht: 5 foot    Wt: 356 pounds    Allergies   Allergen Reactions    Aspirin Hives             We discussed hemoglobin A1c.  Written information was given to patient    Past Medical History:   Diagnosis Date    Anxiety     Arthritis     Asthma     Bipolar 1 disorder     Polycystic ovarian syndrome     Sleep apnea         Past Surgical History:    DILATATION AND CURETTAGE    TONSILLECTOMY    TYMPANOSTOMY TUBE PLACEMENT        Social History     Tobacco Use    Smoking status: Former     Packs/day: 0.25     Years: 20.00     Pack years: 5.00     Types: Cigarettes     Quit date: 2017     Years since quittin.8    Smokeless tobacco: Never   Vaping Use    Vaping Use: Never used   Substance Use Topics    Alcohol use: Yes     Comment: social    Drug use: No        Family History   Problem Relation Age of Onset    Depression Mother         Education Plan as follows:      Blood Glucose Monitoring Instructions and Plan:   We reviewed blood glucose testing and ADA recommended blood glucose level for fasting, pre and post meals. Patient demonstrates understanding and importance of testing blood glucose 1-2 times a day; Patient will log BG results. Patient was given written material including blood glucose log.     Initial Nutrition Instructions and Plan:   Patient was instructed and demonstrated reading a food label. Serving size and carbohydrate amounts were emphasized.   45 carbs per meal 3 meals a day  15-20 carbs per snacks 3   snacks per day.   Patient was given written materials..   [employer] Ann explained and demonstrated to patient.     Medication Instructions and Plan:     Current  Outpatient Medications:     Accu-Chek Guide test strip, 1 each by Other route 2 (Two) Times a Day., Disp: , Rfl:     Accu-Chek Softclix Lancets lancets, 1 each by Other route 2 (Two) Times a Day., Disp: , Rfl:     albuterol (PROAIR HFA) 108 (90 Base) MCG/ACT inhaler, Inhale 2 puffs Every 6 (Six) Hours As Needed for Wheezing., Disp: 1 inhaler, Rfl: 0    albuterol sulfate  (90 Base) MCG/ACT inhaler, Inhale 2 puffs., Disp: , Rfl:     atorvastatin (LIPITOR) 10 MG tablet, Take 1 tablet by mouth every night at bedtime., Disp: , Rfl:     azithromycin (Zithromax Z-Hung) 250 MG tablet, Take 2 tablets by mouth on day 1, then 1 tablet daily on days 2-5, Disp: 6 tablet, Rfl: 0    B-D ULTRAFINE III SHORT PEN 31G X 8 MM misc, USE AS DIRECTED WEEKLY, Disp: , Rfl:     Elastic Bandages & Supports (CARPAL TUNNEL WRIST DELUXE) misc, 2 each Daily., Disp: 2 each, Rfl: 0    furosemide (LASIX) 20 MG tablet, Take 1 tablet by mouth., Disp: , Rfl:     gabapentin (NEURONTIN) 300 MG capsule, TAKE ONE CAPSULE BY MOUTH TWO TIMES A DAY AND 2 CAPSULES AT BEDTIME., Disp: , Rfl: 0    gabapentin (NEURONTIN) 400 MG capsule, , Disp: , Rfl:     HYDROcodone-acetaminophen (NORCO) 7.5-325 MG per tablet, Take 1 tablet by mouth Every 6 (Six) Hours As Needed for Moderate Pain., Disp: , Rfl:     HYDROcodone-acetaminophen (NORCO) 7.5-325 MG per tablet, Take 1 tablet by mouth Every 12 (Twelve) Hours As Needed., Disp: , Rfl:     hydrOXYzine (ATARAX) 25 MG tablet, , Disp: , Rfl:     ibuprofen (ADVIL,MOTRIN) 800 MG tablet, , Disp: , Rfl:     Januvia 25 MG tablet, Take 1 tablet by mouth Daily., Disp: , Rfl:     Latuda 40 MG tablet tablet, Take 1 tablet by mouth Daily., Disp: , Rfl:     Lurasidone HCl (LATUDA) 20 MG tablet tablet, Take  by mouth., Disp: , Rfl:     montelukast (SINGULAIR) 10 MG tablet, Take 1 tablet by mouth Daily., Disp: , Rfl:     Ozempic, 1 MG/DOSE, 4 MG/3ML solution pen-injector, INJECT 1 MG SUBCUTANEOUSLY ONCE WEEKLY, Disp: , Rfl:      ranitidine (ZANTAC) 150 MG capsule, Take 1 capsule by mouth., Disp: , Rfl:     spironolactone (ALDACTONE) 25 MG tablet, Take 1 tablet by mouth Daily., Disp: , Rfl:     Sprintec 28 0.25-35 MG-MCG per tablet, Take 1 tablet by mouth Daily., Disp: , Rfl:     traZODone (DESYREL) 100 MG tablet, Take 1 tablet by mouth every night at bedtime., Disp: , Rfl:     vitamin B-12 (CYANOCOBALAMIN) 500 MCG tablet, TAKE 1 TABLET 2 X WEEKLY, Disp: 12 tablet, Rfl: 3   Medication list was reviewed with patient. Patient denies questions or concerns regarding current medication therapy. Patient was instructed to continue medications as prescribed.     Exercise:   Patient has been instructed to walk for 10 minutes after each meal initially and build strength and endurance to achieve 30 minutes of sustained exercise per day; recommended patient seek advice from Primary Care Provider prior to beginning any exercise program if other health concerns exist.     Problem solving and reducing risks:   I explained the importance of keeping provider appointments, taking medications as prescribed, having laboratory work performed as ordered by provider and seeking care as soon as possible when complications occur.     Patient Selected Behavioral Goal :  #1 -To continue to decrease portion sizes      Patient Selected Ongoing Support Plan:  Friend Support          Follow up Instructions and Plan:Patient was encouraged to contact DSME staff with questions and or concerns.  DSME staff contact information was given to patient.  Patient will be contacted when group classes resume. DSME staff will contact patient at 3 months and 6 months to evaluate patient's further needs regarding diabetes.      Other: Patient also states she has PCOS.    This note will be forwarded to PCP.  ANTONELLA ABARCA-[facility], MLDE, [facility]    Start Time: 12: 45  End Time: 13: 45      06/05/2023

## 2024-01-08 ENCOUNTER — TRANSCRIBE ORDERS (OUTPATIENT)
Dept: ADMINISTRATIVE | Facility: HOSPITAL | Age: 39
End: 2024-01-08
Payer: MEDICARE

## 2024-01-08 DIAGNOSIS — Z12.31 VISIT FOR SCREENING MAMMOGRAM: Primary | ICD-10-CM

## 2024-02-13 ENCOUNTER — HOSPITAL ENCOUNTER (OUTPATIENT)
Dept: MAMMOGRAPHY | Facility: HOSPITAL | Age: 39
Discharge: HOME | End: 2024-02-13
Admitting: NURSE PRACTITIONER
Payer: MEDICARE

## 2024-02-13 DIAGNOSIS — Z12.31 VISIT FOR SCREENING MAMMOGRAM: ICD-10-CM

## 2024-02-13 PROCEDURE — 77063 BREAST TOMOSYNTHESIS BI: CPT

## 2024-02-13 PROCEDURE — 77067 SCR MAMMO BI INCL CAD: CPT

## 2024-04-01 ENCOUNTER — OFFICE VISIT (OUTPATIENT)
Dept: SURGERY | Facility: CLINIC | Age: 39
End: 2024-04-01
Payer: MEDICARE

## 2024-04-01 ENCOUNTER — PREP FOR SURGERY (OUTPATIENT)
Dept: OTHER | Facility: HOSPITAL | Age: 39
End: 2024-04-01
Payer: MEDICARE

## 2024-04-01 ENCOUNTER — TELEPHONE (OUTPATIENT)
Dept: SURGERY | Facility: CLINIC | Age: 39
End: 2024-04-01

## 2024-04-01 VITALS
WEIGHT: 293 LBS | HEART RATE: 89 BPM | HEIGHT: 60 IN | DIASTOLIC BLOOD PRESSURE: 88 MMHG | BODY MASS INDEX: 57.52 KG/M2 | SYSTOLIC BLOOD PRESSURE: 125 MMHG

## 2024-04-01 DIAGNOSIS — K80.20 SYMPTOMATIC CHOLELITHIASIS: Primary | ICD-10-CM

## 2024-04-01 PROCEDURE — 1159F MED LIST DOCD IN RCRD: CPT | Performed by: SURGERY

## 2024-04-01 PROCEDURE — 1160F RVW MEDS BY RX/DR IN RCRD: CPT | Performed by: SURGERY

## 2024-04-01 PROCEDURE — 99203 OFFICE O/P NEW LOW 30 MIN: CPT | Performed by: SURGERY

## 2024-04-01 RX ORDER — LURASIDONE HYDROCHLORIDE 60 MG/1
TABLET, FILM COATED ORAL EVERY 24 HOURS
COMMUNITY

## 2024-04-01 RX ORDER — BUPROPION HYDROCHLORIDE 150 MG/1
TABLET ORAL
COMMUNITY
Start: 2024-03-19

## 2024-04-01 RX ORDER — ASCORBIC ACID 125 MG
TABLET,CHEWABLE ORAL EVERY 24 HOURS
COMMUNITY
Start: 2024-01-08

## 2024-04-01 RX ORDER — MELOXICAM 7.5 MG/1
TABLET ORAL EVERY 24 HOURS
COMMUNITY

## 2024-04-01 RX ORDER — ERGOCALCIFEROL 1.25 MG/1
CAPSULE ORAL
COMMUNITY
Start: 2023-10-31

## 2024-04-01 RX ORDER — HYDROCODONE BITARTRATE AND ACETAMINOPHEN 5; 325 MG/1; MG/1
TABLET ORAL
COMMUNITY
Start: 2024-02-09

## 2024-04-01 RX ORDER — BACLOFEN 10 MG/1
TABLET ORAL
COMMUNITY
Start: 2024-03-19

## 2024-04-01 RX ORDER — INDOCYANINE GREEN AND WATER 25 MG
2.5 KIT INJECTION
OUTPATIENT
Start: 2024-04-01

## 2024-04-01 RX ORDER — TRAZODONE HYDROCHLORIDE 150 MG/1
TABLET ORAL
COMMUNITY
Start: 2024-02-04

## 2024-04-01 NOTE — PROGRESS NOTES
Chief Complaint:  Cholelithiasis    Primary Care Provider: Ale Pace APRN    Referring Provider: Ale Pace APRN    History of Present Illness  Catherine Grady is a 39 y.o. female referred by JOANA Alonso for symptomatic gallstones.  The patient has been having occasional right upper quadrant pain radiating to her right scapula on and off for the past few years.  The pain was significant enough that she went to the emergency room at [facility] in [place] on 3/21/2024.    Patient had a CT scan done on 3/1/2024 at [facility] in [place] and one of the findings is the presence of gallstones.  A copy of the radiology report is available in the EMR.    Labs were done on 1/8/2024 and LFTs were normal.  A copy of the lab report is available in the EMR.    Patient says the pain she had when she was in the emergency room has resolved.  Her right upper quadrant abdominal pain seems to occur after eating, especially after eating fatty and greasy foods.  She had just finished eating fried chicken on the day she went to the emergency room.    Patient smokes cigarettes and is significantly overweight.  She otherwise does not have any medical issues.  She works at [employer].    Allergies: Aspirin    Outpatient Medications Marked as Taking for the 4/1/24 encounter (Office Visit) with Jai Arias MD   Medication Sig Dispense Refill    Accu-Chek Guide test strip 1 each by Other route 2 (Two) Times a Day.      Accu-Chek Softclix Lancets lancets 1 each by Other route 2 (Two) Times a Day.      albuterol (PROAIR HFA) 108 (90 Base) MCG/ACT inhaler Inhale 2 puffs Every 6 (Six) Hours As Needed for Wheezing. 1 inhaler 0    atorvastatin (LIPITOR) 10 MG tablet Take 1 tablet by mouth Every Night.      B-D ULTRAFINE III SHORT PEN 31G X 8 MM misc USE AS DIRECTED WEEKLY      baclofen (LIORESAL) 10 MG tablet 1 tab(s) orally At Bedtime for 30 day(s)      buPROPion XL (WELLBUTRIN XL) 150 MG 24 hr tablet 1 tab(s) orally  "every 24 hours for 30 day(s)      Cyanocobalamin (B-12) 5000 MCG capsule Daily.      ergocalciferol (ERGOCALCIFEROL) 1.25 MG (76641 UT) capsule 1 cap(s) orally twice a week for 30 day(s)      gabapentin (NEURONTIN) 400 MG capsule       HYDROcodone-acetaminophen (NORCO) 5-325 MG per tablet       hydrOXYzine (ATARAX) 25 MG tablet       ibuprofen (IBU) 800 MG tablet Take 1 tablet by mouth Every 6 (Six) Hours As Needed.      Latuda 60 MG tablet tablet Daily.      Ozempic, 2 MG/DOSE, 8 MG/3ML solution pen-injector INJECT 2MG SUBCUTANEOUS EVERY WEEK      SITagliptin (Januvia) 25 MG tablet Take 1 tablet by mouth Daily.      Sprintec 28 0.25-35 MG-MCG per tablet Take 1 tablet by mouth Daily.         Past Medical History:    Anxiety    Arthritis    Asthma    Bipolar 1 disorder    Diabetes mellitus    Polycystic ovarian syndrome    Sleep apnea        Past Surgical History:    DILATATION AND CURETTAGE    TONSILLECTOMY    TYMPANOSTOMY TUBE PLACEMENT       Family History:   Family History   Problem Relation Age of Onset    Depression Mother         Social History:  Social History     Tobacco Use    Smoking status: Former     Current packs/day: 0.00     Average packs/day: 0.3 packs/day for 20.0 years (5.0 ttl pk-yrs)     Types: Cigarettes     Start date: 1997     Quit date: 2017     Years since quittin.6    Smokeless tobacco: Never   Substance Use Topics    Alcohol use: Not Currently       Objective     Vital Signs:  /88 (BP Location: Other (Comment), Patient Position: Sitting, Cuff Size: Adult)   Pulse 89   Ht 152.4 cm (60\")   Wt (!) 157 kg (345 lb 6.4 oz)   BMI 67.46 kg/m²   Constitutional: Overweight, Female friend present  Respiratory:  breathing not labored, respiratory effort appears normal  Cardiovascular:  heart regular rate  Abdomen:  obese  Skin and subcutaneous tissue:  no jaundice  Musculoskeletal: moving all extremities symmetrically and purposefully  Neurologic:  no obvious motor or sensory " deficits, alert & oriented x 3, speech clear  Psychiatric:  judgment and insight intact      Assessment:  Diagnoses and all orders for this visit:    1. Symptomatic cholelithiasis (Primary)        Plan:  Robotic cholecystectomy with cholangiogram    Discussion: Indications, options, risks, benefits, and expected outcomes of planned surgery were discussed with the patient and she agrees to proceed.      Jai Arias MD  04/01/2024    Electronically signed by Jai Arias MD, 04/01/24, 3:45 PM EDT.

## 2024-04-01 NOTE — TELEPHONE ENCOUNTER
Tried calling pt to see if she wants to come in earlier today for her appt. Was going to offer her 11:45am, if she cannot do that time; anytime will be fine. No answer. Not able to leave a message.

## 2024-05-28 RX ORDER — CITALOPRAM 20 MG/1
20 TABLET ORAL NIGHTLY
COMMUNITY

## 2024-05-28 RX ORDER — DOCUSATE SODIUM 100 MG/1
200 CAPSULE, LIQUID FILLED ORAL NIGHTLY
COMMUNITY

## 2024-05-28 NOTE — PRE-PROCEDURE INSTRUCTIONS
IMPORTANT INSTRUCTIONS - PRE-ADMISSION TESTING  DO NOT EAT, DRINK OR CHEW anything after midnight the night before your procedure.    Take the following medications the morning of your procedure with JUST A SIP OF WATER: INHALER, HYDROXYZINE  IF NEEDED, SPRINTEC, INHALER, HYDROCODONE IF NEEDED, LATUDA  HOLD OZEMPIC 7 DAYS PRIOR TO PROCEDURE    DO NOT BRING your medications to the hospital with you, UNLESS something has changed since your PRE-Admission Testing appointment.  Hold all vitamins, supplements, and NSAIDS (Non- steroidal anti-inflammatory meds) for one week prior to surgery (you MAY take Tylenol or Acetaminophen).  If you are diabetic, check your blood sugar the morning of your procedure. If it is less than 70 or if you are feeling symptomatic, call the following number for further instructions: [phone] SAME DAY SURGERY.  Use your inhalers/nebulizers as usual, the morning of your procedure. BRING YOUR INHALERS with you.   Bring your CPAP or BIPAP to hospital, ONLY IF YOU WILL BE SPENDING THE NIGHT.   Make sure you have a ride home and have someone who will stay with you the day of your procedure after you go home.  If you have any questions, please call your Pre-Admission Testing Nurse, GARCIA_ at [phone]_.   Per anesthesia request, do not smoke for 24 hours before your procedure or as instructed by your surgeon.    PREOPERATIVE (BEFORE SURGERY)              BATHING INSTRUCTIONS  Instructions:    You will need to shower 1 TIME   Wash your hair and face with normal shampoo and soap, rinse it well before using the surgical soap.      In the shower, wet the skin completely with water from your neck to your feet. Apply the cleanser to your   body ONLY FROM THE NECK TO YOUR FEET.     Do NOT USE THE CLEANSER ON YOUR FACE, HEAD, OR GENITAL (PRIVATE) AREAS.   Keep it out of your eyes, ears, and mouth because of the risk of injury to those areas.      Scrub with a clean washcloth for each bath utilizing  the soap provided from the top of your body to the   bottom starting at the neck area.      Pay close attention to your armpits, groin area, and the site of surgery.      Wash your body gently for 5 minutes. Stand outside the stream or turn off the water while scrubbing your   body. Do NOT wash with your regular soap after the surgical cleanser is used.      RINSE THE CLEANSER OFF COMPLETELY with plenty of water. Rinse the area again thoroughly.      Dry off with a clean towel. The surgical soap can cause dryness; however do NOT APPLY LOTION,   CREAM, POWDER, and/or DEODORANT AFTER SHOWERING.     Be sure to where clean clothes after showering.      Ensure CLEAN BED LINENS AFTER FIRST wash with the surgical soap.      NO PETS ALLOWED IN THE BED with you after utilizing the surgical soap.

## 2024-05-29 ENCOUNTER — ANESTHESIA EVENT (OUTPATIENT)
Dept: PERIOP | Facility: HOSPITAL | Age: 39
End: 2024-05-29
Payer: MEDICARE

## 2024-05-30 ENCOUNTER — HOSPITAL ENCOUNTER (OUTPATIENT)
Facility: HOSPITAL | Age: 39
Setting detail: HOSPITAL OUTPATIENT SURGERY
Discharge: HOME | End: 2024-05-30
Attending: SURGERY | Admitting: SURGERY
Payer: MEDICARE

## 2024-05-30 ENCOUNTER — APPOINTMENT (OUTPATIENT)
Dept: GENERAL RADIOLOGY | Facility: HOSPITAL | Age: 39
End: 2024-05-30
Payer: MEDICARE

## 2024-05-30 ENCOUNTER — ANESTHESIA (OUTPATIENT)
Dept: PERIOP | Facility: HOSPITAL | Age: 39
End: 2024-05-30
Payer: MEDICARE

## 2024-05-30 VITALS
DIASTOLIC BLOOD PRESSURE: 70 MMHG | HEART RATE: 83 BPM | HEIGHT: 60 IN | BODY MASS INDEX: 57.52 KG/M2 | OXYGEN SATURATION: 93 % | WEIGHT: 293 LBS | RESPIRATION RATE: 20 BRPM | SYSTOLIC BLOOD PRESSURE: 127 MMHG | TEMPERATURE: 96.7 F

## 2024-05-30 DIAGNOSIS — K80.20 SYMPTOMATIC CHOLELITHIASIS: ICD-10-CM

## 2024-05-30 LAB
ANION GAP SERPL CALCULATED.3IONS-SCNC: 10.6 MMOL/L (ref 5–15)
B-HCG UR QL: NEGATIVE
BUN SERPL-MCNC: 9 MG/DL (ref 6–20)
BUN/CREAT SERPL: 17 (ref 7–25)
CALCIUM SPEC-SCNC: 8.9 MG/DL (ref 8.6–10.5)
CHLORIDE SERPL-SCNC: 100 MMOL/L (ref 98–107)
CO2 SERPL-SCNC: 26.4 MMOL/L (ref 22–29)
CREAT SERPL-MCNC: 0.53 MG/DL (ref 0.57–1)
EGFRCR SERPLBLD CKD-EPI 2021: 120.1 ML/MIN/1.73
GLUCOSE BLDC GLUCOMTR-MCNC: 148 MG/DL (ref 70–99)
GLUCOSE SERPL-MCNC: 103 MG/DL (ref 65–99)
POTASSIUM SERPL-SCNC: 4.5 MMOL/L (ref 3.5–5.2)
SODIUM SERPL-SCNC: 137 MMOL/L (ref 136–145)

## 2024-05-30 PROCEDURE — 25810000003 LACTATED RINGERS PER 1000 ML: Performed by: ANESTHESIOLOGY

## 2024-05-30 PROCEDURE — 25010000002 DEXAMETHASONE PER 1 MG

## 2024-05-30 PROCEDURE — 25010000002 ONDANSETRON PER 1 MG

## 2024-05-30 PROCEDURE — 25010000002 SUGAMMADEX 200 MG/2ML SOLUTION

## 2024-05-30 PROCEDURE — S2900 ROBOTIC SURGICAL SYSTEM: HCPCS | Performed by: SURGERY

## 2024-05-30 PROCEDURE — 25010000002 PROPOFOL 10 MG/ML EMULSION

## 2024-05-30 PROCEDURE — 25010000002 MIDAZOLAM PER 1MG: Performed by: ANESTHESIOLOGY

## 2024-05-30 PROCEDURE — 81025 URINE PREGNANCY TEST: CPT | Performed by: SURGERY

## 2024-05-30 PROCEDURE — S2900 ROBOTIC SURGICAL SYSTEM: HCPCS | Performed by: SPECIALIST/TECHNOLOGIST, OTHER

## 2024-05-30 PROCEDURE — 82948 REAGENT STRIP/BLOOD GLUCOSE: CPT

## 2024-05-30 PROCEDURE — 25010000002 HYDROMORPHONE 1 MG/ML SOLUTION

## 2024-05-30 PROCEDURE — 47563 LAPARO CHOLECYSTECTOMY/GRAPH: CPT | Performed by: SURGERY

## 2024-05-30 PROCEDURE — 47563 LAPARO CHOLECYSTECTOMY/GRAPH: CPT | Performed by: SPECIALIST/TECHNOLOGIST, OTHER

## 2024-05-30 PROCEDURE — 80048 BASIC METABOLIC PNL TOTAL CA: CPT | Performed by: ANESTHESIOLOGY

## 2024-05-30 PROCEDURE — 25010000002 INDOCYANINE GREEN 25 MG RECONSTITUTED SOLUTION: Performed by: SURGERY

## 2024-05-30 PROCEDURE — 25010000002 BUPIVACAINE (PF) 0.25 % SOLUTION: Performed by: SURGERY

## 2024-05-30 PROCEDURE — 25010000002 FENTANYL CITRATE (PF) 50 MCG/ML SOLUTION

## 2024-05-30 PROCEDURE — 76000 FLUOROSCOPY <1 HR PHYS/QHP: CPT

## 2024-05-30 PROCEDURE — 88304 TISSUE EXAM BY PATHOLOGIST: CPT | Performed by: SURGERY

## 2024-05-30 DEVICE — CLIP LIG HEMOLOK PA LG 6CT PRP: Type: IMPLANTABLE DEVICE | Site: ABDOMEN | Status: FUNCTIONAL

## 2024-05-30 RX ORDER — PROMETHAZINE HYDROCHLORIDE 12.5 MG/1
25 TABLET ORAL ONCE AS NEEDED
Status: DISCONTINUED | OUTPATIENT
Start: 2024-05-30 | End: 2024-05-30 | Stop reason: HOSPADM

## 2024-05-30 RX ORDER — LIDOCAINE HYDROCHLORIDE 20 MG/ML
INJECTION, SOLUTION EPIDURAL; INFILTRATION; INTRACAUDAL; PERINEURAL AS NEEDED
Status: DISCONTINUED | OUTPATIENT
Start: 2024-05-30 | End: 2024-05-30 | Stop reason: SURG

## 2024-05-30 RX ORDER — ROCURONIUM BROMIDE 10 MG/ML
INJECTION, SOLUTION INTRAVENOUS AS NEEDED
Status: DISCONTINUED | OUTPATIENT
Start: 2024-05-30 | End: 2024-05-30 | Stop reason: SURG

## 2024-05-30 RX ORDER — PETROLATUM,WHITE
OINTMENT IN PACKET (GRAM) TOPICAL AS NEEDED
Status: DISCONTINUED | OUTPATIENT
Start: 2024-05-30 | End: 2024-05-30 | Stop reason: SURG

## 2024-05-30 RX ORDER — PROPOFOL 10 MG/ML
VIAL (ML) INTRAVENOUS AS NEEDED
Status: DISCONTINUED | OUTPATIENT
Start: 2024-05-30 | End: 2024-05-30 | Stop reason: SURG

## 2024-05-30 RX ORDER — INDOCYANINE GREEN AND WATER 25 MG
2.5 KIT INJECTION
Status: COMPLETED | OUTPATIENT
Start: 2024-05-30 | End: 2024-05-30

## 2024-05-30 RX ORDER — OXYCODONE HYDROCHLORIDE AND ACETAMINOPHEN 5; 325 MG/1; MG/1
1-2 TABLET ORAL EVERY 4 HOURS PRN
Qty: 13 TABLET | Refills: 0 | Status: SHIPPED | OUTPATIENT
Start: 2024-05-30

## 2024-05-30 RX ORDER — DEXAMETHASONE SODIUM PHOSPHATE 4 MG/ML
INJECTION, SOLUTION INTRA-ARTICULAR; INTRALESIONAL; INTRAMUSCULAR; INTRAVENOUS; SOFT TISSUE AS NEEDED
Status: DISCONTINUED | OUTPATIENT
Start: 2024-05-30 | End: 2024-05-30 | Stop reason: SURG

## 2024-05-30 RX ORDER — BUPIVACAINE HYDROCHLORIDE 2.5 MG/ML
INJECTION, SOLUTION EPIDURAL; INFILTRATION; INTRACAUDAL AS NEEDED
Status: DISCONTINUED | OUTPATIENT
Start: 2024-05-30 | End: 2024-05-30 | Stop reason: HOSPADM

## 2024-05-30 RX ORDER — PROMETHAZINE HYDROCHLORIDE 12.5 MG/1
TABLET ORAL
Qty: 12 TABLET | Refills: 0 | Status: SHIPPED | OUTPATIENT
Start: 2024-05-30

## 2024-05-30 RX ORDER — ONDANSETRON 2 MG/ML
4 INJECTION INTRAMUSCULAR; INTRAVENOUS ONCE AS NEEDED
Status: DISCONTINUED | OUTPATIENT
Start: 2024-05-30 | End: 2024-05-30 | Stop reason: HOSPADM

## 2024-05-30 RX ORDER — DEXMEDETOMIDINE HYDROCHLORIDE 100 UG/ML
INJECTION, SOLUTION INTRAVENOUS AS NEEDED
Status: DISCONTINUED | OUTPATIENT
Start: 2024-05-30 | End: 2024-05-30 | Stop reason: SURG

## 2024-05-30 RX ORDER — OXYCODONE HYDROCHLORIDE 5 MG/1
5 TABLET ORAL
Status: COMPLETED | OUTPATIENT
Start: 2024-05-30 | End: 2024-05-30

## 2024-05-30 RX ORDER — ACETAMINOPHEN 500 MG
1000 TABLET ORAL ONCE
Status: COMPLETED | OUTPATIENT
Start: 2024-05-30 | End: 2024-05-30

## 2024-05-30 RX ORDER — EPHEDRINE SULFATE 50 MG/ML
INJECTION INTRAVENOUS AS NEEDED
Status: DISCONTINUED | OUTPATIENT
Start: 2024-05-30 | End: 2024-05-30 | Stop reason: SURG

## 2024-05-30 RX ORDER — SUCCINYLCHOLINE/SOD CL,ISO/PF 100 MG/5ML
SYRINGE (ML) INTRAVENOUS AS NEEDED
Status: DISCONTINUED | OUTPATIENT
Start: 2024-05-30 | End: 2024-05-30 | Stop reason: SURG

## 2024-05-30 RX ORDER — PROMETHAZINE HYDROCHLORIDE 25 MG/1
25 SUPPOSITORY RECTAL ONCE AS NEEDED
Status: DISCONTINUED | OUTPATIENT
Start: 2024-05-30 | End: 2024-05-30 | Stop reason: HOSPADM

## 2024-05-30 RX ORDER — FENTANYL CITRATE 50 UG/ML
INJECTION, SOLUTION INTRAMUSCULAR; INTRAVENOUS AS NEEDED
Status: DISCONTINUED | OUTPATIENT
Start: 2024-05-30 | End: 2024-05-30 | Stop reason: SURG

## 2024-05-30 RX ORDER — SODIUM CHLORIDE, SODIUM LACTATE, POTASSIUM CHLORIDE, CALCIUM CHLORIDE 600; 310; 30; 20 MG/100ML; MG/100ML; MG/100ML; MG/100ML
9 INJECTION, SOLUTION INTRAVENOUS CONTINUOUS PRN
Status: DISCONTINUED | OUTPATIENT
Start: 2024-05-30 | End: 2024-05-30 | Stop reason: HOSPADM

## 2024-05-30 RX ORDER — MIDAZOLAM HYDROCHLORIDE 2 MG/2ML
2 INJECTION, SOLUTION INTRAMUSCULAR; INTRAVENOUS ONCE
Status: COMPLETED | OUTPATIENT
Start: 2024-05-30 | End: 2024-05-30

## 2024-05-30 RX ORDER — HYDROCODONE BITARTRATE AND ACETAMINOPHEN 5; 325 MG/1; MG/1
1 TABLET ORAL EVERY 6 HOURS PRN
Qty: 13 TABLET | Refills: 0 | Status: SHIPPED | OUTPATIENT
Start: 2024-05-30 | End: 2024-05-30 | Stop reason: HOSPADM

## 2024-05-30 RX ORDER — MEPERIDINE HYDROCHLORIDE 25 MG/ML
12.5 INJECTION INTRAMUSCULAR; INTRAVENOUS; SUBCUTANEOUS
Status: DISCONTINUED | OUTPATIENT
Start: 2024-05-30 | End: 2024-05-30 | Stop reason: HOSPADM

## 2024-05-30 RX ORDER — ONDANSETRON 2 MG/ML
INJECTION INTRAMUSCULAR; INTRAVENOUS AS NEEDED
Status: DISCONTINUED | OUTPATIENT
Start: 2024-05-30 | End: 2024-05-30 | Stop reason: SURG

## 2024-05-30 RX ADMIN — DEXAMETHASONE SODIUM PHOSPHATE 4 MG: 4 INJECTION, SOLUTION INTRAMUSCULAR; INTRAVENOUS at 08:03

## 2024-05-30 RX ADMIN — ONDANSETRON HYDROCHLORIDE 4 MG: 2 SOLUTION INTRAMUSCULAR; INTRAVENOUS at 09:05

## 2024-05-30 RX ADMIN — OXYCODONE 5 MG: 5 TABLET ORAL at 09:50

## 2024-05-30 RX ADMIN — ROCURONIUM BROMIDE 45 MG: 10 INJECTION, SOLUTION INTRAVENOUS at 07:41

## 2024-05-30 RX ADMIN — SODIUM CHLORIDE, POTASSIUM CHLORIDE, SODIUM LACTATE AND CALCIUM CHLORIDE: 600; 310; 30; 20 INJECTION, SOLUTION INTRAVENOUS at 08:10

## 2024-05-30 RX ADMIN — ROCURONIUM BROMIDE 20 MG: 10 INJECTION, SOLUTION INTRAVENOUS at 08:03

## 2024-05-30 RX ADMIN — SUGAMMADEX 300 MG: 100 INJECTION, SOLUTION INTRAVENOUS at 09:13

## 2024-05-30 RX ADMIN — ACETAMINOPHEN 1000 MG: 500 TABLET ORAL at 06:46

## 2024-05-30 RX ADMIN — FENTANYL CITRATE 50 MCG: 50 INJECTION, SOLUTION INTRAMUSCULAR; INTRAVENOUS at 07:39

## 2024-05-30 RX ADMIN — HYDROMORPHONE HYDROCHLORIDE 0.5 MG: 1 INJECTION, SOLUTION INTRAMUSCULAR; INTRAVENOUS; SUBCUTANEOUS at 09:50

## 2024-05-30 RX ADMIN — DEXMEDETOMIDINE 5 MCG: 100 INJECTION, SOLUTION INTRAVENOUS at 08:40

## 2024-05-30 RX ADMIN — MIDAZOLAM HYDROCHLORIDE 2 MG: 1 INJECTION, SOLUTION INTRAMUSCULAR; INTRAVENOUS at 06:49

## 2024-05-30 RX ADMIN — ROCURONIUM BROMIDE 5 MG: 10 INJECTION, SOLUTION INTRAVENOUS at 07:30

## 2024-05-30 RX ADMIN — ROCURONIUM BROMIDE 10 MG: 10 INJECTION, SOLUTION INTRAVENOUS at 08:36

## 2024-05-30 RX ADMIN — PROPOFOL 200 MG: 10 INJECTION, EMULSION INTRAVENOUS at 07:30

## 2024-05-30 RX ADMIN — PETROLATUM 1 PACKAGE: 5 OINTMENT TOPICAL at 09:12

## 2024-05-30 RX ADMIN — HYDROMORPHONE HYDROCHLORIDE 0.25 MG: 1 INJECTION, SOLUTION INTRAMUSCULAR; INTRAVENOUS; SUBCUTANEOUS at 10:09

## 2024-05-30 RX ADMIN — EPHEDRINE SULFATE 10 MG: 50 INJECTION INTRAVENOUS at 07:55

## 2024-05-30 RX ADMIN — INDOCYANINE GREEN AND WATER 2.5 MG: KIT at 06:56

## 2024-05-30 RX ADMIN — DEXMEDETOMIDINE 5 MCG: 100 INJECTION, SOLUTION INTRAVENOUS at 08:47

## 2024-05-30 RX ADMIN — SODIUM CHLORIDE, POTASSIUM CHLORIDE, SODIUM LACTATE AND CALCIUM CHLORIDE 9 ML/HR: 600; 310; 30; 20 INJECTION, SOLUTION INTRAVENOUS at 06:53

## 2024-05-30 RX ADMIN — HYDROMORPHONE HYDROCHLORIDE 0.5 MG: 1 INJECTION, SOLUTION INTRAMUSCULAR; INTRAVENOUS; SUBCUTANEOUS at 09:40

## 2024-05-30 RX ADMIN — LIDOCAINE HYDROCHLORIDE 100 MG: 20 INJECTION, SOLUTION INTRAVENOUS at 07:30

## 2024-05-30 RX ADMIN — FENTANYL CITRATE 50 MCG: 50 INJECTION, SOLUTION INTRAMUSCULAR; INTRAVENOUS at 07:30

## 2024-05-30 RX ADMIN — Medication 100 MG: at 07:30

## 2024-05-30 RX ADMIN — OXYCODONE 5 MG: 5 TABLET ORAL at 10:09

## 2024-05-30 NOTE — OP NOTE
Operative Report    Patient Name:  Catherine Grady  YOB: 1985    Date of Surgery:  5/30/2024     Pre-op Diagnosis:   Symptomatic cholelithiasis [K80.20]       Post-op Diagnosis:   Post-Op Diagnosis Codes:     * Symptomatic cholelithiasis [K80.20]    Procedure:  Robotic cholecystectomy with cholangiogram    Staff:  Surgeon(s):  Jai Arias MD    Assistant: Toño Nguyen CSA    Anesthesia: General    Estimated Blood Loss: 100 mL    Complications:  None    Drains:  None    Packing:  None    Implants:    Implant Name Type Inv. Item Serial No.  Lot No. LRB No. Used Action   CLIP LIG HEMOLOK PA LG 6CT PRP - [id number] Implant CLIP LIG HEMOLOK PA LG 6CT PRP  [employer] [id number] N/A 1 Implanted       Specimen:          Specimens       ID Source Type Tests Collected By Collected At Frozen?    A Gallbladder Tissue TISSUE PATHOLOGY EXAM   Jai Arias MD 5/30/24 0905     Description: gallbladder and contents          Indications:  See my preop H&P note.     Findings:  Several gallstones in the gallbladder.  Cholangiogram normal appearing.  Liver enlarged and with an appearance consistent with a fatty liver.    Findings:  Liver normal appearing.  Cholangiogram normal appearing.  Gallstones in gallbladder.     Description of Procedure: Patient was taken to the operating room and placed supine on the operative table.  Timeout was performed.  General anesthesia was administered.  The patient was prepped and draped in the usual fashion.  Using a Veress needle at the left upper quadrant, pneumoperitoneum was established and then four 8 mm robotic cannulas were placed.  The patient was positioned head up.  The robotic arms were docked.  The robotic instruments were inserted.  Attention was focused at the right upper quadrant.  The liver was enlarged and with appearance consistent with a fatty live.  A robotic grasper was placed on the fundus of the gallbladder and used to elevate the  gallbladder superiorly.  Another robotic grasper was placed on the infundibulum of the gallbladder and used to apply lateral and inferior retraction.  Hook cautery was used to dissect the tissue in the [place] using ICG overlay intermittently until I achieved a critical view of safety.  A cholangiogram catheter was introduced through the abdominal wall via an angiocatheter.  A cystic ductotomy was made using hook cautery and the cholangiogram catheter was placed into the cystic duct through the ducotomy and secured with a clip.  Cholangiogram was performed and showed normal flow of contrast into the duodenum without filling defects or abnormalities.  The cholangiogram catheter was removed.  The cystic duct was clipped twice proximal to the ducotomy and then the cystic duct was divided at the ducotomy with cautery.  The artery was clipped proximally and then divided distally with cautery.  Hook electrocautery was used to cauterize the cholecystohepatic attachments until the gallbladder was completely freed from the liver.  As the gallbladder was beign dissected from the liver, I encountered a posterior artery to the gallbladder and bleeding occurred from the artery.  The bleeding was controlled and a clip was placed and the bleeding stopped.  There was about 100 and mils of blood loss that occurred during the process.  ICG green overlay was used intermittently as the gallbladder was removed from the liver and no accessory ducts were visualized.  The gallbladder fossa was examined there was no bleeding or leakage of bile.  The gallbladder fossa and the right upper quadrant was irrigated with sterile fluid and the irrigant was suctioned.  Any old blood was suctioned as well.  The gallbladder was removed from the abdomen in an Endo Catch bag and sent to pathology.  Ex vivo palpation the gallbladder revealed gallstones.  The fascial incision at the cannula site where the gallbladder was removed was closed with  an 0 Vicryl suture using the suture passer.  Sponge needle and instrument counts were verified as correct.  The robotic arms were undocked and the robotic cannulas were removed.  The skin incisions were closed appropriately with buried absorbable suture followed by appropriate dressings.  Patient did well during the procedure and was transported to the recovery area in stable condition.     Assistant: Toño LEWIS CSA  was responsible for performing the following activities: closing, placing dressing and assisting with docking robot and exchanging robotic instruments and adjusting robotic arms as needed during the procedure, and his skilled assistance was necessary for the success of this case.      Jai Arias MD     Date: 5/30/2024  Time: 09:51 EDT    Electronically signed by Jai Arias MD, 05/30/24, 9:51 AM EDT.

## 2024-05-30 NOTE — ANESTHESIA POSTPROCEDURE EVALUATION
Patient: Catherine Grady    Procedure Summary       Date: 05/30/24 Room / Location: [facility] OR 08 / [facility] MAIN OR    Anesthesia Start: 0718 Anesthesia Stop: 0940    Procedure: Robotic cholecystectomy with cholangiogram (Abdomen) Diagnosis:       Symptomatic cholelithiasis      (Symptomatic cholelithiasis [K80.20])    Surgeons: Jai Arias MD Provider: Vandana Gómez MD    Anesthesia Type: general ASA Status: 3            Anesthesia Type: general    Vitals  Vitals Value Taken Time   /68 05/30/24 1117   Temp 36.3 °C (97.3 °F) 05/30/24 0935   Pulse 80 05/30/24 1118   Resp 16 05/30/24 1110   SpO2 88 % 05/30/24 1118   Vitals shown include unfiled device data.        Post Anesthesia Care and Evaluation    Patient location during evaluation: bedside  Patient participation: complete - patient participated  Level of consciousness: awake  Pain management: adequate    Airway patency: patent  PONV Status: none  Cardiovascular status: acceptable and stable  Respiratory status: acceptable (temp Bipap in PACU, doing well off now)  Hydration status: acceptable

## 2024-05-30 NOTE — DISCHARGE INSTRUCTIONS
DISCHARGE INSTRUCTIONS LAPAROSCOPIC CHOLECYSTECTOMY  (GALL BLADDER)      For your surgery you had:  General anesthesia (you may have a sore throat for the first 24 hours)  Local anesthesia  You received an anesthesia medication today that can cause hormonal forms of birth control to be ineffective. You should use a different form of birth control (to prevent pregnancy) for 7 days.     You may experience dizziness, drowsiness, or light-headedness for several hours following surgery.  Do not stay alone tonight.  Limit your activity for 24 hours.  Resume your diet slowly.  Follow whatever special dietary instructions you may have been given by your doctor.  You should not drive, operate machinery, drink alcohol, or sign legally binding documents for 24 hours or while you are taking pain medication.    Last dose of pain medication was given at:   TYLENOL 1000 MG AT 6:46 AM  OXY IR 5 MG AT 9:50 AM  OXY IR 5 MG AT 10:09 AM  .    NOTIFY YOUR DOCTOR IF YOU EXPERIENCE ANY OF THE FOLLOWING:  Temperature greater than 101 degrees Fahrenheit  Shaking Chills  Redness or excessive drainage from incision  Nausea, vomiting and/or pain that is not controlled by prescribed medications  Increase in bleeding or bleeding that is excessive  Unable to urinate in 6 hours after surgery  If unable to reach your doctor, please go to the closest Emergency Room You may remove Band-Aid/dressing  SKIN GLUE WILL FLAKE OFF IN 10-14 DAYS; DO NOT SCRUB IN SHOWER; DO SUBMERGE IN WATER  .  You may shower or bathe FRIDAY AFTERNOON  Apply an ice pack 24-48 hours.  You may experience gas discomfort 24-48 hours after discharge, especially in chest and shoulders.  Changing position frequently may alleviate this discomfort.  If you have excessive pain, swelling, redness, drainage or other problems, notify your physician.  If unable to urinate in 6 to 8 hours after surgery or urinating frequently in small amounts, notify your doctor or go to the nearest  Emergency Room.  Medications per physician instructions as indicated on Discharge Medication Information Sheet.      SPECIAL INSTRUCTIONS:      PLEASE FOLLOW ALL POST-OP INSTRUCTIONS BELOW FROM DR ARIAS:      Dr. Arias's Instructions          DIET  Gradually increase your dietary intake.  Although you will likely feel very hungry after surgery, do not eat too much for the first 12 to 24 hours.  Begin with a bland diet, such as chicken noodle soup, crackers, gatorade or tea, and gradually work your way up to a normal diet.     ACTIVITY & RETURN TO WORK  When you first get home from the hospital, it is important that you get up and move around your house.  For the next four weeks, you should avoid any strenuous physical activity and you should not lift anything heavier than 15 pounds.  Walking up stairs and walking short distances for exercise are acceptable activities.  After four weeks, you have no activity restrictions and may gradually increase your activities using common sense.  You are excused from work for four weeks but you may return to work anytime before then should you feel able to do so and you can abide by the lifting restriction.     WOUND CARE & SHOWERING/BATHING  Your incisions are covered with a plastic type material that will flake off on its own in the next few weeks.  If the plastic type material has not flaked off on its own by 2 weeks after your surgery then use tweezers to pull it off.  You have sutures in your incisions but they are placed below the level of the skin and they will slowly dissolve (they do not need to be removed).  The skin around your incisions will likely have some bruising.  This is normal.  You can shower beginning tomorrow but wait two weeks after your surgery before taking any tub baths.     PAIN CONTROL  You will receive a narcotic pain medicine and an anti-nausea medicine before you are discharged home.  Be sure to take the narcotic pain medication with some food so  as not to upset your stomach.  Do not drive while you are taking the prescription pain medication.  Once the prescription pain medication is used up, use the pain medication you already have at home.  Some patients find that using an ice pack (a package of frozen corn or peas works well) for the first two days after surgery helps reduce pain.  If you decide to use an ice pack, apply it for 20 minutes and then remove it for 20 minutes.  Do this 4 or 5 times each day for only the first two or three days after surgery.  You will likely have some shoulder pain (especially the right shoulder).  This is caused by the air used to inflate your abdomen during surgery and will go away on its own in 24 to 48 hours.     BOWEL MOVEMENTS  It is not unusual for narcotic pain medications to cause constipation.  Also, the medications and anesthesia you received for your surgery can have a constipating effect.  To help avoid constipation, drink at least four eight-ounce glasses of water each day and use over-the-counter laxatives/stool softeners (dulcolax, milk of magnesia, senokot, etc.).  I recommend drinking the aforementioned amount of water daily and taking 30 ml of milk of magnesia two times each day while you are using the prescribed narcotic pain medication.  If your bowel movements become too loose or too frequent, then simply stop following these recommendations unless you start to feel constipated.     FOLLOW-UP VISIT & QUESTIONS/CONCERNS  Call Dr. Arias's office at [phone] and schedule a follow-up appointment for about 3 to 4 weeks after your surgery date.  Should you have any questions or concerns, have a temperature over 101 degrees, worsening abdominal pain, persistent nausea or vomiting, or any other problems you think need medical attention, please call Dr. Arias's office or go to the emergency room.

## 2024-05-30 NOTE — ANESTHESIA PREPROCEDURE EVALUATION
Anesthesia Evaluation     Patient summary reviewed and Nursing notes reviewed   no history of anesthetic complications:   NPO Solid Status: > 8 hours  NPO Liquid Status: > 2 hours           Airway   Mallampati: III  TM distance: >3 FB  Neck ROM: full  Possible difficult intubation, Large neck circumference and Small opening  Dental - normal exam     Pulmonary - normal exam    breath sounds clear to auscultation  (+) asthma,sleep apnea (autoBiPap)  Cardiovascular - normal exam  Exercise tolerance: poor (<4 METS)    Rhythm: regular  Rate: normal        Neuro/Psych  (+) TIA (2015, stress related), psychiatric history Anxiety and Bipolar  GI/Hepatic/Renal/Endo    (+) morbid obesity (bmi 68), diabetes mellitus (Januvia, Ozempic (stopped 5/16/24)) type 2    Musculoskeletal     Abdominal   (+) obese   Substance History - negative use     OB/GYN negative ob/gyn ROS         Other   arthritis,     ROS/Med Hx Other: PAT Nursing Notes unavailable.                     Anesthesia Plan    ASA 3     general     (Patient understands anesthesia not responsible for dental damage.)  intravenous induction     Anesthetic plan, risks, benefits, and alternatives have been provided, discussed and informed consent has been obtained with: patient and other.    Plan discussed with CRNA.        CODE STATUS:

## 2024-05-30 NOTE — H&P
Chief Complaint:  No chief complaint on file.    Primary Care Provider: Ale Pace APRN    Referring Provider: Ale Pace APRN    Patient is here for gallbladder surgery.  Following is a copy and paste of the HPI from my office visit with the patient.    History of Present Illness  Catherine Grady is a 39 y.o. female referred by Jai Arias MD for symptomatic gallstones.  The patient has been having occasional right upper quadrant pain radiating to her right scapula on and off for the past few years.  The pain was significant enough that she went to the emergency room at [facility] in [place] on 3/21/2024.    Patient had a CT scan done on 3/1/2024 at [facility] in [place] and one of the findings is the presence of gallstones.  A copy of the radiology report is available in the EMR.    Labs were done on 1/8/2024 and LFTs were normal.  A copy of the lab report is available in the EMR.    Patient says the pain she had when she was in the emergency room has resolved.  Her right upper quadrant abdominal pain seems to occur after eating, especially after eating fatty and greasy foods.  She had just finished eating fried chicken on the day she went to the emergency room.    Patient smokes cigarettes and is significantly overweight.  She otherwise does not have any medical issues.  She works at [employer].    Allergies: Aspirin and Morphine    Outpatient Medications Marked as Taking for the 5/30/24 encounter (Hospital Encounter)   Medication Sig Dispense Refill    atorvastatin (LIPITOR) 10 MG tablet Take 1 tablet by mouth Every Night.      baclofen (LIORESAL) 10 MG tablet 1 tab(s) orally At Bedtime for 30 day(s)      citalopram (CeleXA) 20 MG tablet Take 1 tablet by mouth Every Night.      docusate sodium (COLACE) 100 MG capsule Take 2 capsules by mouth Every Night.      ergocalciferol (ERGOCALCIFEROL) 1.25 MG (74197 UT) capsule 1 cap(s) orally twice a week for 30 day(s)      furosemide (LASIX) 20 MG  "tablet Take 1 tablet by mouth Daily.      gabapentin (NEURONTIN) 400 MG capsule Take 1 capsule by mouth 3 (Three) Times a Day.      HYDROcodone-acetaminophen (NORCO)  MG per tablet Take 1 tablet by mouth 3 (Three) Times a Day.      hydrOXYzine (ATARAX) 25 MG tablet Take 1 tablet by mouth Every 8 (Eight) Hours As Needed.      ibuprofen (IBU) 800 MG tablet Take 1 tablet by mouth Every 6 (Six) Hours As Needed.      Latuda 60 MG tablet tablet Take 1 tablet by mouth Daily.      Ozempic, 2 MG/DOSE, 8 MG/3ML solution pen-injector Last dose 05/16/24. Instructed to hold 7 days preop      SITagliptin (Januvia) 25 MG tablet Take 1 tablet by mouth Daily.      Sprintec 28 0.25-35 MG-MCG per tablet Take 1 tablet by mouth Daily.         Past Medical History:    Anxiety    Arthritis    Asthma    Bipolar 1 disorder    Chronic pain    back-pain management    Diabetes mellitus    Gallstones    Polycystic ovarian syndrome    Sleep apnea    TIA (transient ischemic attack)    greater than 5 yrs ago, no residual issues        Past Surgical History:    DILATATION AND CURETTAGE    TONSILLECTOMY    TYMPANOSTOMY TUBE PLACEMENT       Family History:   Family History   Problem Relation Age of Onset    Depression Mother         Social History:  Social History     Tobacco Use    Smoking status: Every Day     Current packs/day: 1.00     Average packs/day: 0.3 packs/day for 21.4 years (6.4 ttl pk-yrs)     Types: Cigarettes     Start date: 8/1/1997     Last attempt to quit: 8/1/2017    Smokeless tobacco: Never   Substance Use Topics    Alcohol use: Not Currently       Objective     Vital Signs:  /76 (BP Location: Right arm, Patient Position: Sitting)   Pulse 82   Temp 96.7 °F (35.9 °C) (Tympanic)   Resp 18   Ht 152.4 cm (60\")   Wt (!) 159 kg (349 lb 6.9 oz)   SpO2 96%   BMI 68.24 kg/m²   Respiratory:  breathing not labored, respiratory effort appears normal  Cardiovascular:  heart regular rate  Skin and subcutaneous tissue:  no " jaundice  Musculoskeletal: moving all extremities symmetrically and purposefully  Neurologic:  no obvious motor or sensory deficits  Psychiatric:  judgment and insight intact      Assessment:  Symptomatic cholelithiasis    Plan:  Robotic cholecystectomy with cholangiogram    Discussion: Indications, options, risks, benefits, and expected outcomes of planned surgery were discussed with the patient and she agrees to proceed.      Jai Arias MD  05/30/2024    Electronically signed by Jai Arias MD, 05/30/24, 6:58 AM EDT.

## 2024-05-31 LAB
CYTO UR: NORMAL
LAB AP CASE REPORT: NORMAL
LAB AP CLINICAL INFORMATION: NORMAL
PATH REPORT.FINAL DX SPEC: NORMAL
PATH REPORT.GROSS SPEC: NORMAL

## 2024-06-03 ENCOUNTER — TELEPHONE (OUTPATIENT)
Dept: SURGERY | Facility: CLINIC | Age: 39
End: 2024-06-03
Payer: MEDICARE

## 2024-06-03 DIAGNOSIS — K80.20 SYMPTOMATIC CHOLELITHIASIS: Primary | ICD-10-CM

## 2024-06-03 NOTE — TELEPHONE ENCOUNTER
Patient states she is still in pain and needs more pain medication sent in. Wants sent to hospital.     #[phone]

## 2024-06-04 RX ORDER — HYDROCODONE BITARTRATE AND ACETAMINOPHEN 5; 325 MG/1; MG/1
1 TABLET ORAL EVERY 6 HOURS PRN
Qty: 10 TABLET | Refills: 0 | Status: SHIPPED | OUTPATIENT
Start: 2024-06-04

## 2024-06-18 ENCOUNTER — TELEPHONE (OUTPATIENT)
Dept: SURGERY | Facility: CLINIC | Age: 39
End: 2024-06-18

## 2024-06-18 NOTE — TELEPHONE ENCOUNTER
Provider: DR HARMON    Caller: CR SINGH    Relationship to Patient: SELF    Phone Number: [phone]    Reason for Call: PT CALLED AND CANCELED TODAY'S APPT. PT WAS SEMAJ TO 7-1-24

## 2024-07-01 ENCOUNTER — OFFICE VISIT (OUTPATIENT)
Dept: SURGERY | Facility: CLINIC | Age: 39
End: 2024-07-01
Payer: MEDICARE

## 2024-07-01 VITALS
BODY MASS INDEX: 57.52 KG/M2 | SYSTOLIC BLOOD PRESSURE: 131 MMHG | HEIGHT: 60 IN | WEIGHT: 293 LBS | DIASTOLIC BLOOD PRESSURE: 82 MMHG

## 2024-07-01 DIAGNOSIS — Z09 ENCOUNTER FOR FOLLOW-UP: Primary | ICD-10-CM

## 2024-07-01 PROCEDURE — 99024 POSTOP FOLLOW-UP VISIT: CPT | Performed by: SURGERY

## 2024-07-01 PROCEDURE — 1160F RVW MEDS BY RX/DR IN RCRD: CPT | Performed by: SURGERY

## 2024-07-01 PROCEDURE — 1159F MED LIST DOCD IN RCRD: CPT | Performed by: SURGERY

## 2024-07-01 RX ORDER — DOXEPIN HYDROCHLORIDE 50 MG/1
CAPSULE ORAL
COMMUNITY
Start: 2024-06-25

## 2024-07-01 NOTE — PROGRESS NOTES
Patient is here for follow-up after robotic gallbladder removal for cholangiogram on 6/8/2024 for symptomatic gallstones.  Following is a copy of the findings from her operative note:  Several gallstones in the gallbladder.  Cholangiogram normal appearing.  Liver enlarged and with an appearance consistent with a fatty liver.     Patient is doing well.  No significant complaints or concerns.  Abdominal exam is benign and incisions are healing well.    My assessment is the patient is recovering satisfactory after surgery.  She seems pleased with her outcome thus far.  No new issues to address.  Patient can return to work next week without restrictions.  She can see me PRN.

## 2024-08-06 ENCOUNTER — OFFICE VISIT (OUTPATIENT)
Dept: ORTHOPEDIC SURGERY | Facility: CLINIC | Age: 39
End: 2024-08-06
Payer: MEDICARE

## 2024-08-06 VITALS
HEIGHT: 60 IN | OXYGEN SATURATION: 97 % | SYSTOLIC BLOOD PRESSURE: 143 MMHG | WEIGHT: 293 LBS | HEART RATE: 85 BPM | DIASTOLIC BLOOD PRESSURE: 104 MMHG | BODY MASS INDEX: 57.52 KG/M2

## 2024-08-06 DIAGNOSIS — E66.01 MORBID (SEVERE) OBESITY DUE TO EXCESS CALORIES: ICD-10-CM

## 2024-08-06 DIAGNOSIS — M25.562 PAIN IN BOTH KNEES, UNSPECIFIED CHRONICITY: Primary | ICD-10-CM

## 2024-08-06 DIAGNOSIS — M25.561 PAIN IN BOTH KNEES, UNSPECIFIED CHRONICITY: Primary | ICD-10-CM

## 2024-08-06 DIAGNOSIS — M17.0 BILATERAL PRIMARY OSTEOARTHRITIS OF KNEE: ICD-10-CM

## 2024-08-06 RX ORDER — DIAZEPAM 5 MG/1
TABLET ORAL
COMMUNITY
Start: 2024-07-09

## 2024-08-06 RX ORDER — ZOLPIDEM TARTRATE 10 MG/1
TABLET ORAL
COMMUNITY
Start: 2024-07-23

## 2024-08-06 RX ORDER — ZOLPIDEM TARTRATE 5 MG/1
TABLET ORAL
COMMUNITY
Start: 2024-07-08

## 2024-08-06 RX ORDER — TIRZEPATIDE 2.5 MG/.5ML
INJECTION, SOLUTION SUBCUTANEOUS
COMMUNITY
Start: 2024-07-23

## 2024-08-06 RX ADMIN — LIDOCAINE HYDROCHLORIDE 5 ML: 10 INJECTION, SOLUTION INFILTRATION; PERINEURAL at 16:44

## 2024-08-06 RX ADMIN — TRIAMCINOLONE ACETONIDE 40 MG: 40 INJECTION, SUSPENSION INTRA-ARTICULAR; INTRAMUSCULAR at 16:44

## 2024-08-06 NOTE — PROGRESS NOTES
"Chief Complaint  Pain and Initial Evaluation of the Right Knee and Pain and Initial Evaluation of the Left Knee         Subjective      Catherine Grady presents to [facility] for an evaluation of bilateral knee pain. She has had bilateral knee pain for several years. She went to an orthopedic in [place] where she had a prior injections in the past but states this did not help. She has pain with prolonged walking and standing. She reports no prior surgery, falls, trauma or injury.     Allergies   Allergen Reactions    Aspirin Hives    Morphine Mental Status Change     \"Makes me mean\"        Social History     Socioeconomic History    Marital status: Single   Tobacco Use    Smoking status: Every Day     Current packs/day: 1.00     Average packs/day: 0.3 packs/day for 21.6 years (6.6 ttl pk-yrs)     Types: Cigarettes     Start date: 8/1/1997     Last attempt to quit: 8/1/2017    Smokeless tobacco: Never   Vaping Use    Vaping status: Never Used   Substance and Sexual Activity    Alcohol use: Not Currently    Drug use: No    Sexual activity: Defer        I reviewed the patient's chief complaint, history of present illness, review of systems, past medical history, surgical history, family history, social history, medications, and allergy list.     Review of Systems     Constitutional: Denies fevers, chills, weight loss  Cardiovascular: Denies chest pain, shortness of breath  Skin: Denies rashes, acute skin changes  Neurologic: Denies headache, loss of consciousness  MSK: Bilateral knee pain       Vital Signs:   BP (!) 143/104 Comment: pt aware of BP dileep Fu with pcp  Pulse 85   Ht 152.4 cm (60\")   Wt (!) 159 kg (350 lb)   SpO2 97%   BMI 68.35 kg/m²            Ortho Exam    Physical Exam  General:Alert. No acute distress     bilateral lower extremity: full extension, flexion to 120 degrees, mild tenderness to the medial joint line , non tender to the lateral joint line, negative  " Светлана's, negative  Lachman's, stable to varus/valgus stress, stable to anterior/posterior drawer , calf soft, neurovascularly intact, positive EHL, FHL, GS, and TA. Sensation intact to all 5 nerves of the foot.     Left knee: L knee  Date/Time: 8/6/2024 4:44 PM  Consent given by: patient  Site marked: site marked  Timeout: Immediately prior to procedure a time out was called to verify the correct patient, procedure, equipment, support staff and site/side marked as required   Supporting Documentation  Indications: pain   Procedure Details  Location: knee - L knee  Needle gauge: 21 G.  Medications administered: 40 mg triamcinolone acetonide 40 MG/ML; 5 mL lidocaine 1 %  Patient tolerance: patient tolerated the procedure well with no immediate complications      This injection documentation was Scribed for Johnathon Godwin MD by Yasmin Moore MA.  08/06/24   16:45 EDT      X-Ray Report:  Right knee X-Ray  Indication: Evaluation of right knee pain   AP/Lateral and Standing view(s)  Findings: advanced degenerative change to the right knee, bone on bone, tricompartmental   Prior studies available for comparison: no     X-Ray Report:  Left knee X-Ray  Indication: Evaluation of left knee pain   AP/Lateral and Standing view(s)  Findings: advance degenerative changes to the left knee, bone on bone, tricompartmental  Prior studies available for comparison: no       Imaging Results (Most Recent)       Procedure Component Value Units Date/Time    XR Knee Standing Right [[id number]] Resulted: 08/06/24 1559     Updated: 08/06/24 1604    XR Knee Standing Left [[id number]] Resulted: 08/06/24 1559     Updated: 08/06/24 1604             Result Review :       No results found.           Assessment and Plan     Diagnoses and all orders for this visit:    1. Pain in both knees, unspecified chronicity (Primary)  -     XR Knee Standing Right  -     XR Knee Standing Left    2. Bilateral primary osteoarthritis of knee    3.  Morbid (severe) obesity due to excess calories      The patient presents here today for an evaluation of bilateral knee pain. X-rays were obtained in the office today and these were reviewed today.     Discussed the risks and benefits of a left knee steroid injection today in the office. Patient expressed understanding and wishes to proceed. She tolerated the injection well and without any complications.     Educated on risk of elevated BMI.  Discussed options for weight loss/decreasing BMI prior to procedure including dietician consult, weight loss options and exercise program. and Call or return if worsening symptoms.    Follow Up     6 - 8 weeks     Patient was given instructions and counseling regarding her condition or for health maintenance advice. Please see specific information pulled into the AVS if appropriate.     Scribed for Johnathon Godwin MD by Lucie Clemente.  08/06/24   16:11 EDT

## 2024-08-07 RX ORDER — TRIAMCINOLONE ACETONIDE 40 MG/ML
40 INJECTION, SUSPENSION INTRA-ARTICULAR; INTRAMUSCULAR
Status: COMPLETED | OUTPATIENT
Start: 2024-08-06 | End: 2024-08-06

## 2024-08-07 RX ORDER — LIDOCAINE HYDROCHLORIDE 10 MG/ML
5 INJECTION, SOLUTION INFILTRATION; PERINEURAL
Status: COMPLETED | OUTPATIENT
Start: 2024-08-06 | End: 2024-08-06

## 2025-06-26 ENCOUNTER — TRANSCRIBE ORDERS (OUTPATIENT)
Dept: PHYSICAL THERAPY | Facility: CLINIC | Age: 40
End: 2025-06-26
Payer: MEDICARE

## 2025-06-26 DIAGNOSIS — M47.816 LUMBAR SPONDYLOSIS: Primary | ICD-10-CM

## (undated) DEVICE — DAVINCI-LF: Brand: MEDLINE INDUSTRIES, INC.

## (undated) DEVICE — COVER,LIGHT HANDLE,FLX,1/PK: Brand: MEDLINE INDUSTRIES, INC.

## (undated) DEVICE — INTENDED FOR TISSUE SEPARATION, AND OTHER PROCEDURES THAT REQUIRE A SHARP SURGICAL BLADE TO PUNCTURE OR CUT.: Brand: BARD-PARKER ® CARBON RIB-BACK BLADES

## (undated) DEVICE — ADHS SKIN PREMIERPRO EXOFIN TOPICAL HI/VISC .5ML

## (undated) DEVICE — SEAL

## (undated) DEVICE — SUCTION IRRIGATOR: Brand: ENDOWRIST

## (undated) DEVICE — COVER,C-ARM,41X74: Brand: MEDLINE

## (undated) DEVICE — TISSUE RETRIEVAL SYSTEM: Brand: INZII RETRIEVAL SYSTEM

## (undated) DEVICE — LAPAROSCOPIC SMOKE EVACUATION SYSTEM ACTIVE AND PASSIVE: Brand: VALLEYLAB

## (undated) DEVICE — SOL NACL 0.9PCT 1000ML

## (undated) DEVICE — SLV SCD KN/LEN ADJ EXPRSS BLENDED MD 1P/U

## (undated) DEVICE — 3 RING SUTURE PASSER - 16 CM: Brand: 3 RING SUTURE PASSER - 16 CM

## (undated) DEVICE — LAP-PORT CLOSURE DEVICE GUIDES 5MM AND 10/12 MM: Brand: LAP-PORT CLOSURE DEVICE GUIDES 5MM AND 10/12 MM

## (undated) DEVICE — ENDOPATH PNEUMONEEDLE INSUFFLATION NEEDLES WITH LUER LOCK CONNECTORS 120MM: Brand: ENDOPATH

## (undated) DEVICE — LARGE BORE STOPCOCK WITH ROTATING MALE LUER LOCK

## (undated) DEVICE — LAPAROVUE VISIBILITY SYSTEM LAPAROSCOPIC SOLUTIONS: Brand: LAPAROVUE

## (undated) DEVICE — SUT MNCRYL 4/0 PS2 18 IN

## (undated) DEVICE — GLV SURG BIOGEL LTX PF 7 1/2

## (undated) DEVICE — ARM DRAPE

## (undated) DEVICE — SYR LL TP 10ML STRL

## (undated) DEVICE — SUT VIC PLS CTD BR 0 TIE 18IN VIL